# Patient Record
Sex: FEMALE | Race: WHITE | NOT HISPANIC OR LATINO | Employment: OTHER | URBAN - METROPOLITAN AREA
[De-identification: names, ages, dates, MRNs, and addresses within clinical notes are randomized per-mention and may not be internally consistent; named-entity substitution may affect disease eponyms.]

---

## 2017-05-04 ENCOUNTER — RECORDS - HEALTHEAST (OUTPATIENT)
Dept: LAB | Facility: CLINIC | Age: 62
End: 2017-05-04

## 2017-05-05 LAB
CHOLEST SERPL-MCNC: 181 MG/DL
FASTING STATUS PATIENT QL REPORTED: NORMAL
HDLC SERPL-MCNC: 57 MG/DL
LDLC SERPL CALC-MCNC: 98 MG/DL
TRIGL SERPL-MCNC: 131 MG/DL

## 2017-11-27 ENCOUNTER — RECORDS - HEALTHEAST (OUTPATIENT)
Dept: LAB | Facility: CLINIC | Age: 62
End: 2017-11-27

## 2017-11-28 LAB
CHOLEST SERPL-MCNC: 201 MG/DL
FASTING STATUS PATIENT QL REPORTED: ABNORMAL
HDLC SERPL-MCNC: 61 MG/DL
LDLC SERPL CALC-MCNC: 105 MG/DL
TRIGL SERPL-MCNC: 177 MG/DL

## 2018-02-05 ENCOUNTER — RECORDS - HEALTHEAST (OUTPATIENT)
Dept: LAB | Facility: CLINIC | Age: 63
End: 2018-02-05

## 2018-02-08 LAB — BACTERIA SPEC CULT: NORMAL

## 2018-09-03 ENCOUNTER — RECORDS - HEALTHEAST (OUTPATIENT)
Dept: LAB | Facility: CLINIC | Age: 63
End: 2018-09-03

## 2018-09-05 LAB — BACTERIA SPEC CULT: NORMAL

## 2019-03-08 ENCOUNTER — RECORDS - HEALTHEAST (OUTPATIENT)
Dept: LAB | Facility: CLINIC | Age: 64
End: 2019-03-08

## 2019-03-08 LAB
ANION GAP SERPL CALCULATED.3IONS-SCNC: 10 MMOL/L (ref 5–18)
BUN SERPL-MCNC: 14 MG/DL (ref 8–22)
CALCIUM SERPL-MCNC: 9.7 MG/DL (ref 8.5–10.5)
CHLORIDE BLD-SCNC: 103 MMOL/L (ref 98–107)
CHOLEST SERPL-MCNC: 175 MG/DL
CO2 SERPL-SCNC: 24 MMOL/L (ref 22–31)
CREAT SERPL-MCNC: 0.69 MG/DL (ref 0.6–1.1)
FASTING STATUS PATIENT QL REPORTED: NO
GFR SERPL CREATININE-BSD FRML MDRD: >60 ML/MIN/1.73M2
GLUCOSE BLD-MCNC: 151 MG/DL (ref 70–125)
HDLC SERPL-MCNC: 64 MG/DL
LDLC SERPL CALC-MCNC: 80 MG/DL
POTASSIUM BLD-SCNC: 3.6 MMOL/L (ref 3.5–5)
SODIUM SERPL-SCNC: 137 MMOL/L (ref 136–145)
TRIGL SERPL-MCNC: 154 MG/DL

## 2019-04-07 ENCOUNTER — RECORDS - HEALTHEAST (OUTPATIENT)
Dept: LAB | Facility: CLINIC | Age: 64
End: 2019-04-07

## 2019-04-09 LAB — BACTERIA SPEC CULT: NORMAL

## 2019-06-24 ENCOUNTER — RECORDS - HEALTHEAST (OUTPATIENT)
Dept: LAB | Facility: CLINIC | Age: 64
End: 2019-06-24

## 2019-06-24 LAB
ANION GAP SERPL CALCULATED.3IONS-SCNC: 12 MMOL/L (ref 5–18)
BUN SERPL-MCNC: 12 MG/DL (ref 8–22)
CALCIUM SERPL-MCNC: 10.1 MG/DL (ref 8.5–10.5)
CHLORIDE BLD-SCNC: 103 MMOL/L (ref 98–107)
CO2 SERPL-SCNC: 27 MMOL/L (ref 22–31)
CREAT SERPL-MCNC: 0.69 MG/DL (ref 0.6–1.1)
GFR SERPL CREATININE-BSD FRML MDRD: >60 ML/MIN/1.73M2
GLUCOSE BLD-MCNC: 121 MG/DL (ref 70–125)
POTASSIUM BLD-SCNC: 3.7 MMOL/L (ref 3.5–5)
SODIUM SERPL-SCNC: 142 MMOL/L (ref 136–145)

## 2020-07-13 ENCOUNTER — RECORDS - HEALTHEAST (OUTPATIENT)
Dept: LAB | Facility: CLINIC | Age: 65
End: 2020-07-13

## 2020-07-13 LAB
ALBUMIN SERPL-MCNC: 3.8 G/DL (ref 3.5–5)
ALP SERPL-CCNC: 69 U/L (ref 45–120)
ALT SERPL W P-5'-P-CCNC: 40 U/L (ref 0–45)
ANION GAP SERPL CALCULATED.3IONS-SCNC: 7 MMOL/L (ref 5–18)
AST SERPL W P-5'-P-CCNC: 24 U/L (ref 0–40)
BILIRUB SERPL-MCNC: 0.4 MG/DL (ref 0–1)
BUN SERPL-MCNC: 13 MG/DL (ref 8–22)
CALCIUM SERPL-MCNC: 10.6 MG/DL (ref 8.5–10.5)
CHLORIDE BLD-SCNC: 102 MMOL/L (ref 98–107)
CHOLEST SERPL-MCNC: 172 MG/DL
CO2 SERPL-SCNC: 31 MMOL/L (ref 22–31)
CREAT SERPL-MCNC: 0.72 MG/DL (ref 0.6–1.1)
FASTING STATUS PATIENT QL REPORTED: NO
GFR SERPL CREATININE-BSD FRML MDRD: >60 ML/MIN/1.73M2
GLUCOSE BLD-MCNC: 98 MG/DL (ref 70–125)
HDLC SERPL-MCNC: 52 MG/DL
LDLC SERPL CALC-MCNC: 77 MG/DL
POTASSIUM BLD-SCNC: 3.5 MMOL/L (ref 3.5–5)
PROT SERPL-MCNC: 6.7 G/DL (ref 6–8)
SODIUM SERPL-SCNC: 140 MMOL/L (ref 136–145)
TRIGL SERPL-MCNC: 215 MG/DL

## 2020-11-18 ENCOUNTER — RECORDS - HEALTHEAST (OUTPATIENT)
Dept: LAB | Facility: CLINIC | Age: 65
End: 2020-11-18

## 2020-11-18 LAB
ALBUMIN SERPL-MCNC: 3.7 G/DL (ref 3.5–5)
ALP SERPL-CCNC: 70 U/L (ref 45–120)
ALT SERPL W P-5'-P-CCNC: 22 U/L (ref 0–45)
ANION GAP SERPL CALCULATED.3IONS-SCNC: 13 MMOL/L (ref 5–18)
AST SERPL W P-5'-P-CCNC: 22 U/L (ref 0–40)
BILIRUB SERPL-MCNC: 0.5 MG/DL (ref 0–1)
BUN SERPL-MCNC: 14 MG/DL (ref 8–22)
CALCIUM SERPL-MCNC: 9.9 MG/DL (ref 8.5–10.5)
CHLORIDE BLD-SCNC: 104 MMOL/L (ref 98–107)
CO2 SERPL-SCNC: 22 MMOL/L (ref 22–31)
CREAT SERPL-MCNC: 0.71 MG/DL (ref 0.6–1.1)
ERYTHROCYTE [SEDIMENTATION RATE] IN BLOOD BY WESTERGREN METHOD: 16 MM/HR (ref 0–20)
GFR SERPL CREATININE-BSD FRML MDRD: >60 ML/MIN/1.73M2
GLUCOSE BLD-MCNC: 136 MG/DL (ref 70–125)
POTASSIUM BLD-SCNC: 3.5 MMOL/L (ref 3.5–5)
PROT SERPL-MCNC: 6.7 G/DL (ref 6–8)
SODIUM SERPL-SCNC: 139 MMOL/L (ref 136–145)
TSH SERPL DL<=0.005 MIU/L-ACNC: 1.74 UIU/ML (ref 0.3–5)
VIT B12 SERPL-MCNC: 396 PG/ML (ref 213–816)

## 2020-11-19 LAB — B BURGDOR IGG+IGM SER QL: 0.04 INDEX VALUE

## 2021-05-30 ENCOUNTER — RECORDS - HEALTHEAST (OUTPATIENT)
Dept: ADMINISTRATIVE | Facility: CLINIC | Age: 66
End: 2021-05-30

## 2021-07-23 ENCOUNTER — LAB REQUISITION (OUTPATIENT)
Dept: LAB | Facility: CLINIC | Age: 66
End: 2021-07-23

## 2021-07-23 DIAGNOSIS — I10 ESSENTIAL (PRIMARY) HYPERTENSION: ICD-10-CM

## 2021-07-23 DIAGNOSIS — E78.2 MIXED HYPERLIPIDEMIA: ICD-10-CM

## 2021-07-23 LAB
ANION GAP SERPL CALCULATED.3IONS-SCNC: 12 MMOL/L (ref 5–18)
BUN SERPL-MCNC: 7 MG/DL (ref 8–22)
CALCIUM SERPL-MCNC: 9.8 MG/DL (ref 8.5–10.5)
CHLORIDE BLD-SCNC: 101 MMOL/L (ref 98–107)
CHOLEST SERPL-MCNC: 168 MG/DL
CO2 SERPL-SCNC: 25 MMOL/L (ref 22–31)
CREAT SERPL-MCNC: 0.71 MG/DL (ref 0.6–1.1)
FASTING STATUS PATIENT QL REPORTED: NORMAL
GFR SERPL CREATININE-BSD FRML MDRD: 88 ML/MIN/1.73M2
GLUCOSE BLD-MCNC: 181 MG/DL (ref 70–125)
HDLC SERPL-MCNC: 57 MG/DL
LDLC SERPL CALC-MCNC: 87 MG/DL
POTASSIUM BLD-SCNC: 3.9 MMOL/L (ref 3.5–5)
SODIUM SERPL-SCNC: 138 MMOL/L (ref 136–145)
TRIGL SERPL-MCNC: 119 MG/DL

## 2021-07-23 PROCEDURE — 80061 LIPID PANEL: CPT | Performed by: PHYSICIAN ASSISTANT

## 2021-07-23 PROCEDURE — 80048 BASIC METABOLIC PNL TOTAL CA: CPT | Performed by: PHYSICIAN ASSISTANT

## 2022-07-06 ENCOUNTER — LAB REQUISITION (OUTPATIENT)
Dept: LAB | Facility: CLINIC | Age: 67
End: 2022-07-06

## 2022-07-06 DIAGNOSIS — I10 ESSENTIAL (PRIMARY) HYPERTENSION: ICD-10-CM

## 2022-07-06 DIAGNOSIS — E78.2 MIXED HYPERLIPIDEMIA: ICD-10-CM

## 2022-07-06 LAB
ANION GAP SERPL CALCULATED.3IONS-SCNC: 12 MMOL/L (ref 7–15)
BUN SERPL-MCNC: 10.2 MG/DL (ref 8–23)
CALCIUM SERPL-MCNC: 10.2 MG/DL (ref 8.8–10.2)
CHLORIDE SERPL-SCNC: 100 MMOL/L (ref 98–107)
CHOLEST SERPL-MCNC: 197 MG/DL
CREAT SERPL-MCNC: 0.63 MG/DL (ref 0.51–0.95)
DEPRECATED HCO3 PLAS-SCNC: 27 MMOL/L (ref 22–29)
GFR SERPL CREATININE-BSD FRML MDRD: >90 ML/MIN/1.73M2
GLUCOSE SERPL-MCNC: 108 MG/DL (ref 70–99)
HDLC SERPL-MCNC: 64 MG/DL
LDLC SERPL CALC-MCNC: 107 MG/DL
NONHDLC SERPL-MCNC: 133 MG/DL
POTASSIUM SERPL-SCNC: 3.6 MMOL/L (ref 3.4–5.3)
SODIUM SERPL-SCNC: 139 MMOL/L (ref 136–145)
TRIGL SERPL-MCNC: 130 MG/DL

## 2022-07-06 PROCEDURE — 80061 LIPID PANEL: CPT | Performed by: PHYSICIAN ASSISTANT

## 2022-07-06 PROCEDURE — 82310 ASSAY OF CALCIUM: CPT | Performed by: PHYSICIAN ASSISTANT

## 2022-12-28 ENCOUNTER — LAB REQUISITION (OUTPATIENT)
Dept: LAB | Facility: CLINIC | Age: 67
End: 2022-12-28

## 2022-12-28 DIAGNOSIS — I10 ESSENTIAL (PRIMARY) HYPERTENSION: ICD-10-CM

## 2022-12-28 LAB
ANION GAP SERPL CALCULATED.3IONS-SCNC: 14 MMOL/L (ref 7–15)
BUN SERPL-MCNC: 9.6 MG/DL (ref 8–23)
CALCIUM SERPL-MCNC: 10.3 MG/DL (ref 8.8–10.2)
CHLORIDE SERPL-SCNC: 101 MMOL/L (ref 98–107)
CREAT SERPL-MCNC: 0.66 MG/DL (ref 0.51–0.95)
DEPRECATED HCO3 PLAS-SCNC: 22 MMOL/L (ref 22–29)
GFR SERPL CREATININE-BSD FRML MDRD: >90 ML/MIN/1.73M2
GLUCOSE SERPL-MCNC: 118 MG/DL (ref 70–99)
POTASSIUM SERPL-SCNC: 3.9 MMOL/L (ref 3.4–5.3)
SODIUM SERPL-SCNC: 137 MMOL/L (ref 136–145)

## 2022-12-28 PROCEDURE — 80048 BASIC METABOLIC PNL TOTAL CA: CPT | Performed by: PHYSICIAN ASSISTANT

## 2023-01-06 ENCOUNTER — LAB REQUISITION (OUTPATIENT)
Dept: LAB | Facility: CLINIC | Age: 68
End: 2023-01-06

## 2023-01-06 DIAGNOSIS — E83.52 HYPERCALCEMIA: ICD-10-CM

## 2023-01-06 LAB
ANION GAP SERPL CALCULATED.3IONS-SCNC: 13 MMOL/L (ref 7–15)
BUN SERPL-MCNC: 11.3 MG/DL (ref 8–23)
CALCIUM SERPL-MCNC: 10.1 MG/DL (ref 8.8–10.2)
CHLORIDE SERPL-SCNC: 101 MMOL/L (ref 98–107)
CREAT SERPL-MCNC: 0.62 MG/DL (ref 0.51–0.95)
DEPRECATED CALCIDIOL+CALCIFEROL SERPL-MC: 32 UG/L (ref 20–75)
DEPRECATED HCO3 PLAS-SCNC: 24 MMOL/L (ref 22–29)
GFR SERPL CREATININE-BSD FRML MDRD: >90 ML/MIN/1.73M2
GLUCOSE SERPL-MCNC: 100 MG/DL (ref 70–99)
POTASSIUM SERPL-SCNC: 4.3 MMOL/L (ref 3.4–5.3)
PTH-INTACT SERPL-MCNC: 47 PG/ML (ref 15–65)
SODIUM SERPL-SCNC: 138 MMOL/L (ref 136–145)

## 2023-01-06 PROCEDURE — 80048 BASIC METABOLIC PNL TOTAL CA: CPT | Performed by: PHYSICIAN ASSISTANT

## 2023-01-06 PROCEDURE — 82306 VITAMIN D 25 HYDROXY: CPT | Performed by: PHYSICIAN ASSISTANT

## 2023-01-06 PROCEDURE — 83970 ASSAY OF PARATHORMONE: CPT | Performed by: PHYSICIAN ASSISTANT

## 2023-07-21 ENCOUNTER — LAB REQUISITION (OUTPATIENT)
Dept: LAB | Facility: CLINIC | Age: 68
End: 2023-07-21

## 2023-07-21 DIAGNOSIS — I10 ESSENTIAL (PRIMARY) HYPERTENSION: ICD-10-CM

## 2023-07-21 DIAGNOSIS — E78.2 MIXED HYPERLIPIDEMIA: ICD-10-CM

## 2023-07-21 LAB
ALBUMIN SERPL BCG-MCNC: 4.1 G/DL (ref 3.5–5.2)
ALP SERPL-CCNC: 91 U/L (ref 35–104)
ALT SERPL W P-5'-P-CCNC: 24 U/L (ref 0–50)
ANION GAP SERPL CALCULATED.3IONS-SCNC: 11 MMOL/L (ref 7–15)
AST SERPL W P-5'-P-CCNC: 19 U/L (ref 0–45)
BILIRUB SERPL-MCNC: 0.4 MG/DL
BUN SERPL-MCNC: 6.7 MG/DL (ref 8–23)
CALCIUM SERPL-MCNC: 9.7 MG/DL (ref 8.8–10.2)
CHLORIDE SERPL-SCNC: 103 MMOL/L (ref 98–107)
CHOLEST SERPL-MCNC: 175 MG/DL
CREAT SERPL-MCNC: 0.73 MG/DL (ref 0.51–0.95)
DEPRECATED HCO3 PLAS-SCNC: 26 MMOL/L (ref 22–29)
GFR SERPL CREATININE-BSD FRML MDRD: 88 ML/MIN/1.73M2
GLUCOSE SERPL-MCNC: 123 MG/DL (ref 70–99)
HDLC SERPL-MCNC: 59 MG/DL
LDLC SERPL CALC-MCNC: 91 MG/DL
NONHDLC SERPL-MCNC: 116 MG/DL
POTASSIUM SERPL-SCNC: 4.3 MMOL/L (ref 3.4–5.3)
PROT SERPL-MCNC: 6.3 G/DL (ref 6.4–8.3)
SODIUM SERPL-SCNC: 140 MMOL/L (ref 136–145)
TRIGL SERPL-MCNC: 123 MG/DL

## 2023-07-21 PROCEDURE — 80053 COMPREHEN METABOLIC PANEL: CPT | Performed by: PHYSICIAN ASSISTANT

## 2023-07-21 PROCEDURE — 80061 LIPID PANEL: CPT | Performed by: PHYSICIAN ASSISTANT

## 2024-04-17 ENCOUNTER — TRANSFERRED RECORDS (OUTPATIENT)
Dept: HEALTH INFORMATION MANAGEMENT | Facility: CLINIC | Age: 69
End: 2024-04-17

## 2024-04-17 ENCOUNTER — LAB REQUISITION (OUTPATIENT)
Dept: LAB | Facility: CLINIC | Age: 69
End: 2024-04-17

## 2024-04-17 DIAGNOSIS — I10 ESSENTIAL (PRIMARY) HYPERTENSION: ICD-10-CM

## 2024-04-17 DIAGNOSIS — E78.2 MIXED HYPERLIPIDEMIA: ICD-10-CM

## 2024-04-17 LAB — HBA1C MFR BLD: 5.6 % (ref 4.2–6.1)

## 2024-04-17 PROCEDURE — 80061 LIPID PANEL: CPT | Performed by: PHYSICIAN ASSISTANT

## 2024-04-17 PROCEDURE — 80048 BASIC METABOLIC PNL TOTAL CA: CPT | Performed by: PHYSICIAN ASSISTANT

## 2024-04-19 LAB
ANION GAP SERPL CALCULATED.3IONS-SCNC: 10 MMOL/L (ref 7–15)
BUN SERPL-MCNC: 8.2 MG/DL (ref 8–23)
CALCIUM SERPL-MCNC: 9.8 MG/DL (ref 8.8–10.2)
CHLORIDE SERPL-SCNC: 104 MMOL/L (ref 98–107)
CHOLEST SERPL-MCNC: 179 MG/DL
CREAT SERPL-MCNC: 0.68 MG/DL (ref 0.51–0.95)
DEPRECATED HCO3 PLAS-SCNC: 28 MMOL/L (ref 22–29)
EGFRCR SERPLBLD CKD-EPI 2021: >90 ML/MIN/1.73M2
FASTING STATUS PATIENT QL REPORTED: NORMAL
GLUCOSE SERPL-MCNC: 114 MG/DL (ref 70–99)
HDLC SERPL-MCNC: 58 MG/DL
LDLC SERPL CALC-MCNC: 97 MG/DL
NONHDLC SERPL-MCNC: 121 MG/DL
POTASSIUM SERPL-SCNC: 3.8 MMOL/L (ref 3.4–5.3)
SODIUM SERPL-SCNC: 142 MMOL/L (ref 135–145)
TRIGL SERPL-MCNC: 122 MG/DL

## 2024-05-17 ENCOUNTER — TRANSFERRED RECORDS (OUTPATIENT)
Dept: HEALTH INFORMATION MANAGEMENT | Facility: CLINIC | Age: 69
End: 2024-05-17

## 2024-05-23 ENCOUNTER — TRANSFERRED RECORDS (OUTPATIENT)
Dept: HEALTH INFORMATION MANAGEMENT | Facility: CLINIC | Age: 69
End: 2024-05-23

## 2024-06-11 ENCOUNTER — TRANSFERRED RECORDS (OUTPATIENT)
Dept: HEALTH INFORMATION MANAGEMENT | Facility: CLINIC | Age: 69
End: 2024-06-11
Payer: COMMERCIAL

## 2024-06-19 ENCOUNTER — MEDICAL CORRESPONDENCE (OUTPATIENT)
Dept: HEALTH INFORMATION MANAGEMENT | Facility: CLINIC | Age: 69
End: 2024-06-19
Payer: COMMERCIAL

## 2024-06-19 ENCOUNTER — TELEPHONE (OUTPATIENT)
Dept: OTHER | Age: 69
End: 2024-06-19

## 2024-06-19 NOTE — TELEPHONE ENCOUNTER
What is the Concern:  Tumor, PT REQUESTING Dr. Connell or Dr. Stark  Date the concern started: early May 1  Injury related? no  Is this related to recent surgery?no  Laceration?  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 yes  Location the patient was evaluated and treated for the condition?   Primary Care, Location Jo Felipe at [facility]  Who is referring provider, (name and clinic location) Jo Felipe  What images have been done? MRI; Location and City where images were taken:  [place] in [place], pt also had an US.  XR at [facility]  Could we send this information to you in [place] or would you prefer to receive a phone call?:   Patient would prefer a phone call   Okay to leave a detailed message?: Yes at Cell number on file:    Telephone Information:   Mobile [phone]

## 2024-06-19 NOTE — TELEPHONE ENCOUNTER
Triaged with Dr. Mcclellan who states this patient is OK to wait until 6/27.    Tere Gonzalez LPN

## 2024-06-20 ENCOUNTER — TRANSCRIBE ORDERS (OUTPATIENT)
Dept: OTHER | Age: 69
End: 2024-06-20

## 2024-06-20 DIAGNOSIS — R22.41 LOCALIZED SWELLING, MASS AND LUMP, RIGHT LOWER LIMB: Primary | ICD-10-CM

## 2024-06-26 ENCOUNTER — PRE VISIT (OUTPATIENT)
Dept: ORTHOPEDICS | Facility: CLINIC | Age: 69
End: 2024-06-26
Payer: COMMERCIAL

## 2024-06-27 ENCOUNTER — OFFICE VISIT (OUTPATIENT)
Dept: ORTHOPEDICS | Facility: CLINIC | Age: 69
End: 2024-06-27
Payer: COMMERCIAL

## 2024-06-27 DIAGNOSIS — R22.41 LOCALIZED SWELLING, MASS AND LUMP, RIGHT LOWER LIMB: ICD-10-CM

## 2024-06-27 DIAGNOSIS — M79.89 SOFT TISSUE MASS: Primary | ICD-10-CM

## 2024-06-27 PROCEDURE — 99204 OFFICE O/P NEW MOD 45 MIN: CPT | Mod: 25 | Performed by: PHYSICIAN ASSISTANT

## 2024-06-27 PROCEDURE — 88307 TISSUE EXAM BY PATHOLOGIST: CPT | Mod: TC | Performed by: ORTHOPAEDIC SURGERY

## 2024-06-27 PROCEDURE — 88307 TISSUE EXAM BY PATHOLOGIST: CPT | Mod: 26 | Performed by: PATHOLOGY

## 2024-06-27 PROCEDURE — 88342 IMHCHEM/IMCYTCHM 1ST ANTB: CPT | Mod: 26 | Performed by: PATHOLOGY

## 2024-06-27 PROCEDURE — 20206 BIOPSY MUSCLE PERQ NEEDLE: CPT | Mod: RT | Performed by: ORTHOPAEDIC SURGERY

## 2024-06-27 RX ORDER — DULOXETIN HYDROCHLORIDE 30 MG/1
90 CAPSULE, DELAYED RELEASE ORAL EVERY MORNING
COMMUNITY
Start: 2024-04-25

## 2024-06-27 RX ORDER — EPINEPHRINE 0.3 MG/.3ML
INJECTION SUBCUTANEOUS
COMMUNITY
Start: 2022-11-17 | End: 2024-07-16

## 2024-06-27 RX ORDER — ATENOLOL 25 MG/1
1 TABLET ORAL EVERY MORNING
COMMUNITY

## 2024-06-27 RX ORDER — SIMVASTATIN 20 MG
20 TABLET ORAL EVERY MORNING
COMMUNITY

## 2024-06-27 RX ORDER — METHOCARBAMOL 500 MG/1
TABLET, FILM COATED ORAL
COMMUNITY
Start: 2023-12-27 | End: 2024-07-16

## 2024-06-27 RX ORDER — NAPROXEN 500 MG/1
TABLET ORAL
COMMUNITY
Start: 2023-12-12 | End: 2024-07-16

## 2024-06-27 RX ORDER — LISINOPRIL 30 MG/1
1 TABLET ORAL EVERY MORNING
COMMUNITY
Start: 2024-02-25

## 2024-06-27 NOTE — NURSING NOTE
[facility] [address] [address]  [phone]  Dept: [phone]  ______________________________________________________________________________    Patient: Shonna Joy   : 1955   MRN: [id number]   2024    INVASIVE PROCEDURE SAFETY CHECKLIST    Date: 2024   Procedure: Right thigh needle biopsy  Patient Name: Shonna Joy  MRN: [id number]  YOB: 1955    Action: Complete sections as appropriate. Any discrepancy results in a HARD COPY until resolved.     PRE PROCEDURE:  Patient ID verified with 2 identifiers (name and  or MRN): Yes  Procedure and site verified with patient/designee (when able): Yes  Accurate consent documentation in medical record: Yes  H&P (or appropriate assessment) documented in medical record: NA  H&P must be up to 20 days prior to procedure and updates within 24 hours of procedure as applicable: NA  Relevant diagnostic and radiology test results appropriately labeled and displayed as applicable: Yes  Procedure site(s) marked with provider initials: NA    TIMEOUT:  Time-Out performed immediately prior to starting procedure, including verbal and active participation of all team members addressing the following:Yes  * Correct patient identify  * Confirmed that the correct side and site are marked  * An accurate procedure consent form  * Agreement on the procedure to be done  * Correct patient position  * Relevant images and results are properly labeled and appropriately displayed  * The need to administer antibiotics or fluids for irrigation purposes during the procedure as applicable   * Safety precautions based on patient history or medication use    DURING PROCEDURE: Verification of correct person, site, and procedures any time the responsibility for care of the patient is transferred to another member of the care team.       The following medications were given:          Prior to injection, verified patient identity using patient's name and date of birth.  Due to injection administration, patient instructed to remain in clinic for 15 minutes  afterwards, and to report any adverse reaction to me immediately.    Lidocaine Injection for Biopsy   Medication Name: Lidocaine  NDC 55957-904-23  Drug Amount Wasted:  Yes: 10 mg/ml   Vial/Syringe: Single dose vial  Expiration Date:  5/1/27  Lot: [id number]      Scribed by Jennifer Miguel ATC for Dr. Stark on June 27, 2024 at 2:00 pm based on the provider's statements to me.     Jennifer Miguel ATC

## 2024-06-27 NOTE — NURSING NOTE
Reason For Visit:   Chief Complaint   Patient presents with    Consult     Right thigh mass referred by GISELE Richter. First noticed beginning of May 2024          69 year old  1955        Primary MD: Jo Leon  Ref. MD: Jo Leon        Pain Assessment  Patient Currently in Pain: Yes  0-10 Pain Scale: 1  Primary Pain Location:  (right posterior thigh, also notes pain in back)            Sachin Miguel, ATC

## 2024-06-27 NOTE — LETTER
6/27/2024      Shonna Joy  [address]  [facility] [address]      Dear Colleague,    Thank you for referring your patient, Shonna Joy, to the [facility] [place]. Please see a copy of my visit note below.    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  Await final pathology.    Plan: Tima-Cut biopsy today.    I saw the patient Maki JAQUEZ.  I examined her and performed a biopsy.  This Erich will do the billing for the next clinic encounter.  I agree with her assessment and plan.  Will perform biopsy today and connect with the patient after we have the results.    Procedure note.  After informed consent the right adductor region was prepped and draped sterilely.  Surgical timeout was performed.  The skin was infiltrated with 4 cc of lidocaine.  11 blade was used to penetrate the skin.  3 core biopsy samples were taken with a Tima-Cut needle.  He is appeared myxoid in nature.    Chief Complaint: Right thigh mass    History: Shonna is a 69 year old female here with her  today for further evaluation of a right inner thigh mass.  Patient reports she noticed this about a month ago that there was a mass that the right inner posterior groin area.  She feels some pain with sitting.  It otherwise does not bother her.  It does not wake her up at night.  She is unsure if it is growing.  She does report increased fatigue over the last several months.  She has had an unintentional 8 pound weight loss over the last 6 months.  She does report some fever and night sweats.  Decreased appetite and increased thirst.  No other concerns.    PastMedHx: HTN; history of uterine cancer in 2006    PastSurgHx: Complete hysterectomy and appendectomy in 2006 for uterine cancer with 8 weeks of pelvic radiation including vaginal seeding.    Social History     Tobacco Use    Smoking status: Not on file    Smokeless tobacco: Not on file   Substance Use Topics    Alcohol use:  Not on file     Meds:   Current Outpatient Medications   Medication Sig Dispense Refill    Aspirin 81 MG CAPS Take 1 capsule every day by oral route.      atenolol (TENORMIN) 25 MG tablet Take 1 tablet by mouth daily      DULoxetine (CYMBALTA) 30 MG capsule TAKE 3 CAPSULES BY MOUTH DAILY.*      EPINEPHrine (ANY BX GENERIC EQUIV) 0.3 MG/0.3ML injection 2-pack AS NEEDED intramuscularly 1 dose. 30      lisinopril (ZESTRIL) 30 MG tablet Take 1 tablet by mouth daily at 2 pm      methocarbamol (ROBAXIN) 500 MG tablet take 1- 1.5 tablets by mouth every 4 -6 hrs as needed for back pain*      naproxen (NAPROSYN) 500 MG tablet take 1 tablet with food or milk as needed Orally every 12 hrs for 14 days*      simvastatin (ZOCOR) 20 MG tablet Take 1 tablet by mouth one time daily at bedtime.      Vitamin D, Cholecalciferol, 10 MCG (400 UNIT) CHEW        No current facility-administered medications for this visit.     Allergies:    Allergies   Allergen Reactions    Bee Venom Anaphylaxis and Hives    Penicillins Unknown    Sulfa (Sulfonamide Antibiotics) [Sulfa Antibiotics] Unknown    Sulfasalazine Rash     Review of Systems:  ROS: 10 point ROS neg other than the symptoms noted above in the HPI.    Physical Exam: There were no vitals taken for this visit.  Deniz is a 69-year-old female who is alert and oriented no apparent distress.  She has a nonantalgic reciprocal gait without gait assistance today.  She has a palpable, firm, somewhat mobile golf ball size mass in the adductor muscle of the right thigh proximally.  This is minimally tender.  She has otherwise full range of motion of the right hip.  She is neurovascular intact distally.    Imaging: MRI with and without contrast of the right hip area was reviewed.  This shows an ovoid T1 hypointense and STIR hyperintense heterogeneously enhancing mass located within the right abductor casimiro muscle belly measuring 5.6 x 6.3 x 5.8 cm with mild surrounding intramuscular  edema.    Impression: 69-year-old female with right groin mass in the adductor muscle, concerning for sarcoma    Plan: We explained to the patient that she has a soft tissue mass.  We are unsure what it is.  It is somewhat concerning.  We would recommend a Tima-Cut biopsy in clinic today.  We discussed the risk and benefits of it.  Patient would like to proceed with that.  Refer to Dr. Stark's note for documentation of that procedure.  Once we get the results of this biopsy, we will call the patient and then determine the next plan.  If this is a benign tumor, she would still like it removed, we can discuss the plan for that.  If this was a sarcoma, she would require radiation likely to this area and then surgical excision.  She does have a history of radiation close to this area with her previous uterine cancer.  She will be presented at our tumor conference to if need be.  She agrees with the plan and all questions have been answered today.  Patient was also examined by Dr. Stark, and he agrees with the plan of care.    Sincerely,        Mitchel Stark MD

## 2024-06-27 NOTE — TELEPHONE ENCOUNTER
DIAGNOSIS: RIGHT THIGH MASS   APPOINTMENT DATE: 06/27/2023   NOTES STATUS DETAILS   OFFICE NOTE from referring provider Media Tab Jo Leon PA-C - [place]   MRI PACS [facility]:  06/11/2024 - RT FEMUR     CT SCAN PACS [facility]:  05/23/2024 - RT Lower Extremity

## 2024-06-27 NOTE — PROGRESS NOTES
Chief Complaint: Right thigh mass    History: Shonna is a 69 year old female here with her  today for further evaluation of a right inner thigh mass.  Patient reports she noticed this about a month ago that there was a mass that the right inner posterior groin area.  She feels some pain with sitting.  It otherwise does not bother her.  It does not wake her up at night.  She is unsure if it is growing.  She does report increased fatigue over the last several months.  She has had an unintentional 8 pound weight loss over the last 6 months.  She does report some fever and night sweats.  Decreased appetite and increased thirst.  No other concerns.    PastMedHx: HTN; history of uterine cancer in 2006    PastSurgHx: Complete hysterectomy and appendectomy in 2006 for uterine cancer with 8 weeks of pelvic radiation including vaginal seeding.    Social History     Tobacco Use    Smoking status: Not on file    Smokeless tobacco: Not on file   Substance Use Topics    Alcohol use: Not on file     Meds:   Current Outpatient Medications   Medication Sig Dispense Refill    Aspirin 81 MG CAPS Take 1 capsule every day by oral route.      atenolol (TENORMIN) 25 MG tablet Take 1 tablet by mouth daily      DULoxetine (CYMBALTA) 30 MG capsule TAKE 3 CAPSULES BY MOUTH DAILY.*      EPINEPHrine (ANY BX GENERIC EQUIV) 0.3 MG/0.3ML injection 2-pack AS NEEDED intramuscularly 1 dose. 30      lisinopril (ZESTRIL) 30 MG tablet Take 1 tablet by mouth daily at 2 pm      methocarbamol (ROBAXIN) 500 MG tablet take 1- 1.5 tablets by mouth every 4 -6 hrs as needed for back pain*      naproxen (NAPROSYN) 500 MG tablet take 1 tablet with food or milk as needed Orally every 12 hrs for 14 days*      simvastatin (ZOCOR) 20 MG tablet Take 1 tablet by mouth one time daily at bedtime.      Vitamin D, Cholecalciferol, 10 MCG (400 UNIT) CHEW        No current facility-administered medications for this visit.     Allergies:    Allergies   Allergen  Reactions    Bee Venom Anaphylaxis and Hives    Penicillins Unknown    Sulfa (Sulfonamide Antibiotics) [Sulfa Antibiotics] Unknown    Sulfasalazine Rash     Review of Systems:  ROS: 10 point ROS neg other than the symptoms noted above in the HPI.    Physical Exam: There were no vitals taken for this visit.  Deniz is a 69-year-old female who is alert and oriented no apparent distress.  She has a nonantalgic reciprocal gait without gait assistance today.  She has a palpable, firm, somewhat mobile golf ball size mass in the adductor muscle of the right thigh proximally.  This is minimally tender.  She has otherwise full range of motion of the right hip.  She is neurovascular intact distally.    Imaging: MRI with and without contrast of the right hip area was reviewed.  This shows an ovoid T1 hypointense and STIR hyperintense heterogeneously enhancing mass located within the right abductor casimiro muscle belly measuring 5.6 x 6.3 x 5.8 cm with mild surrounding intramuscular edema.    Impression: 69-year-old female with right groin mass in the adductor muscle, concerning for sarcoma    Plan: We explained to the patient that she has a soft tissue mass.  We are unsure what it is.  It is somewhat concerning.  We would recommend a Tima-Cut biopsy in clinic today.  We discussed the risk and benefits of it.  Patient would like to proceed with that.  Refer to Dr. Stark's note for documentation of that procedure.  Once we get the results of this biopsy, we will call the patient and then determine the next plan.  If this is a benign tumor, she would still like it removed, we can discuss the plan for that.  If this was a sarcoma, she would require radiation likely to this area and then surgical excision.  She does have a history of radiation close to this area with her previous uterine cancer.  She will be presented at our tumor conference to if need be.  She agrees with the plan and all questions have been answered today.  Patient  was also examined by Dr. Stark, and he agrees with the plan of care.

## 2024-06-27 NOTE — PROGRESS NOTES
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  Await final pathology.    Plan: Tima-Cut biopsy today.    I saw the patient Maki JAQUEZ.  I examined her and performed a biopsy.  This Erich will do the billing for the next clinic encounter.  I agree with her assessment and plan.  Will perform biopsy today and connect with the patient after we have the results.    Procedure note.  After informed consent the right adductor region was prepped and draped sterilely.  Surgical timeout was performed.  The skin was infiltrated with 4 cc of lidocaine.  11 blade was used to penetrate the skin.  3 core biopsy samples were taken with a Tima-Cut needle.  He is appeared myxoid in nature.

## 2024-07-01 LAB
PATH REPORT.COMMENTS IMP SPEC: NORMAL
PATH REPORT.COMMENTS IMP SPEC: NORMAL
PATH REPORT.FINAL DX SPEC: NORMAL
PATH REPORT.GROSS SPEC: NORMAL
PATH REPORT.MICROSCOPIC SPEC OTHER STN: NORMAL
PATH REPORT.MICROSCOPIC SPEC OTHER STN: NORMAL
PATH REPORT.RELEVANT HX SPEC: NORMAL
PHOTO IMAGE: NORMAL

## 2024-07-03 ENCOUNTER — MYC MEDICAL ADVICE (OUTPATIENT)
Dept: ORTHOPEDICS | Facility: CLINIC | Age: 69
End: 2024-07-03
Payer: COMMERCIAL

## 2024-07-05 NOTE — TELEPHONE ENCOUNTER
Writer called and left voice message for pt to scheduled a follow up appointment with Dr. Stark. Pt can call clinic back to schedule.     Linda Boland LPN

## 2024-07-16 ENCOUNTER — VIRTUAL VISIT (OUTPATIENT)
Dept: ORTHOPEDICS | Facility: CLINIC | Age: 69
End: 2024-07-16
Payer: COMMERCIAL

## 2024-07-16 VITALS — WEIGHT: 152 LBS | HEIGHT: 62 IN | BODY MASS INDEX: 27.97 KG/M2

## 2024-07-16 DIAGNOSIS — D21.21 MYXOMA OF THIGH, RIGHT: Primary | ICD-10-CM

## 2024-07-16 PROCEDURE — 99442 PR PHYSICIAN TELEPHONE EVALUATION 11-20 MIN: CPT | Performed by: ORTHOPAEDIC SURGERY

## 2024-07-16 ASSESSMENT — PAIN SCALES - GENERAL: PAINLEVEL: NO PAIN (0)

## 2024-07-16 NOTE — NURSING NOTE
[place] [address] [address]    Is the patient currently in the state of [place]? YES    Visit mode:VIDEO    If the visit is dropped, the patient can be reconnected by: VIDEO VISIT: Send to e-mail at: [email or web address]    Will anyone else be joining the visit? NO  (If patient encounters technical issues they should call [phone])    How would you like to obtain your AVS? MyChart    Are changes needed to the allergy or medication list? No    Are refills needed on medications prescribed by this physician? NO    Reason for visit: RECHECK    No other vitals to report per pt    Bria HADDADF

## 2024-07-16 NOTE — PROGRESS NOTES
Virtual Visit Details    Type of service:  Video Visit     Impression: Right medial thigh myxoma, 7 cm    Plan: Patient has chosen surgical removal.  Case request form has been completed.    Our video visit was changed to a phone visit for this patient.  She is doing well she is recovering nicely from her biopsy.    I explained to her the diagnosis of myxoma she had already researched it and confirmed the information I provided.    She reports she has no ecchymosis or changes around the biopsy tract.    We discussed the options of observation versus surgical removal.  She would prefer surgical removal.  I discussed the risk of surgery with her including wound infection, tumor recurrence, postoperative infection, postoperative DVT to mention some.  She understands these and would like to proceed with surgery.    This was a telephone visit began at 1:54 PM and ended at 2:05 PM.  The total length of the visit was 11 minutes

## 2024-07-16 NOTE — LETTER
7/16/2024      Shonna Joy  [address]  [facility] [address]      Dear Colleague,    Thank you for referring your patient, Shonna Joy, to the [facility] [place]. Please see a copy of my visit note below.    Virtual Visit Details    Type of service:  Video Visit     Impression: Right medial thigh myxoma, 7 cm    Plan: Patient has chosen surgical removal.  Case request form has been completed.    Our video visit was changed to a phone visit for this patient.  She is doing well she is recovering nicely from her biopsy.    I explained to her the diagnosis of myxoma she had already researched it and confirmed the information I provided.    She reports she has no ecchymosis or changes around the biopsy tract.    We discussed the options of observation versus surgical removal.  She would prefer surgical removal.  I discussed the risk of surgery with her including wound infection, tumor recurrence, postoperative infection, postoperative DVT to mention some.  She understands these and would like to proceed with surgery.    This was a telephone visit began at 1:54 PM and ended at 2:05 PM.  The total length of the visit was 11 minutes      Mitchel Stark MD

## 2024-07-22 ENCOUNTER — TELEPHONE (OUTPATIENT)
Dept: ORTHOPEDICS | Facility: CLINIC | Age: 69
End: 2024-07-22
Payer: COMMERCIAL

## 2024-07-22 NOTE — TELEPHONE ENCOUNTER
Phoned patient to get her scheduled for surgery with Dr. Stark     Call went to voicemail.  Provided call back number in voicemail:   [phone] & [phone] for care team.   Will try again.

## 2024-07-24 PROBLEM — D21.21: Status: ACTIVE | Noted: 2024-07-16

## 2024-07-24 NOTE — TELEPHONE ENCOUNTER
Patient is scheduled for surgery with Dr. Stark    Spoke with: Shonna    Date of Surgery: 9/6/24    Location: [facility]    Informed patient they will need an adult  Yes    Pre op with Provider PAC,     Additional imaging/appointments: PO Made 8/19/24     Surgery packet: Mailed     Additional comments: Declined earlier dates. Requesting c/b from care team to go over recovery and walker restrictions. Will route to care team.         Luna Pierre on 7/24/2024 at 2:55 PM

## 2024-07-25 NOTE — TELEPHONE ENCOUNTER
FUTURE VISIT INFORMATION      SURGERY INFORMATION:  Date: 9/6/24  [facility]  Surgeon:  Mitchel Stark MD   Anesthesia Type:  general  Procedure: removal right thigh tumor   Consult: ov 7/16/24    RECORDS REQUESTED FROM:       Primary Care Provider: Jo Leon PA-C     Pertinent Medical History: hypertension

## 2024-07-27 ENCOUNTER — HEALTH MAINTENANCE LETTER (OUTPATIENT)
Age: 69
End: 2024-07-27

## 2024-08-19 ENCOUNTER — OFFICE VISIT (OUTPATIENT)
Dept: SURGERY | Facility: CLINIC | Age: 69
End: 2024-08-19
Payer: COMMERCIAL

## 2024-08-19 ENCOUNTER — ANESTHESIA EVENT (OUTPATIENT)
Dept: SURGERY | Facility: AMBULATORY SURGERY CENTER | Age: 69
End: 2024-08-19
Payer: COMMERCIAL

## 2024-08-19 ENCOUNTER — PRE VISIT (OUTPATIENT)
Dept: SURGERY | Facility: CLINIC | Age: 69
End: 2024-08-19

## 2024-08-19 ENCOUNTER — LAB (OUTPATIENT)
Dept: LAB | Facility: CLINIC | Age: 69
End: 2024-08-19
Payer: COMMERCIAL

## 2024-08-19 VITALS
TEMPERATURE: 97.8 F | HEART RATE: 61 BPM | RESPIRATION RATE: 16 BRPM | HEIGHT: 62 IN | WEIGHT: 152.1 LBS | SYSTOLIC BLOOD PRESSURE: 164 MMHG | BODY MASS INDEX: 27.99 KG/M2 | OXYGEN SATURATION: 96 % | DIASTOLIC BLOOD PRESSURE: 99 MMHG

## 2024-08-19 DIAGNOSIS — Z01.818 PRE-OP EVALUATION: Primary | ICD-10-CM

## 2024-08-19 DIAGNOSIS — Z01.818 PRE-OP EVALUATION: ICD-10-CM

## 2024-08-19 DIAGNOSIS — R54 FRAILTY: ICD-10-CM

## 2024-08-19 DIAGNOSIS — D21.21 MYXOMA OF THIGH, RIGHT: ICD-10-CM

## 2024-08-19 LAB
ERYTHROCYTE [DISTWIDTH] IN BLOOD BY AUTOMATED COUNT: 12.3 % (ref 10–15)
HCT VFR BLD AUTO: 45.2 % (ref 35–47)
HGB BLD-MCNC: 14.6 G/DL (ref 11.7–15.7)
MCH RBC QN AUTO: 29.6 PG (ref 26.5–33)
MCHC RBC AUTO-ENTMCNC: 32.3 G/DL (ref 31.5–36.5)
MCV RBC AUTO: 92 FL (ref 78–100)
PLATELET # BLD AUTO: 294 10E3/UL (ref 150–450)
RBC # BLD AUTO: 4.94 10E6/UL (ref 3.8–5.2)
WBC # BLD AUTO: 6.1 10E3/UL (ref 4–11)

## 2024-08-19 PROCEDURE — 36415 COLL VENOUS BLD VENIPUNCTURE: CPT | Performed by: PATHOLOGY

## 2024-08-19 PROCEDURE — 99203 OFFICE O/P NEW LOW 30 MIN: CPT | Performed by: PHYSICIAN ASSISTANT

## 2024-08-19 PROCEDURE — 85027 COMPLETE CBC AUTOMATED: CPT | Performed by: PATHOLOGY

## 2024-08-19 RX ORDER — CALCIUM CARBONATE 500 MG/1
1 TABLET, CHEWABLE ORAL 2 TIMES DAILY
COMMUNITY
End: 2024-09-20

## 2024-08-19 ASSESSMENT — PAIN SCALES - GENERAL: PAINLEVEL: NO PAIN (0)

## 2024-08-19 NOTE — PATIENT INSTRUCTIONS
Preparing for Your Surgery      Name:  Shonna Joy   MRN:  [id number]   :  1955   Today's Date:  2024         Arriving for surgery:  Surgery date:  2024  Arrival time:  5:45 AM    Restrictions due to COVID 19:    Please maintain social distance.  Masking is optional        parking is available for anyone with mobility limitations or disabilities. (Monday- Friday 7 am- 5 pm)    Please come to:    [facility]  [address] [place] [address]    Check in on the 5th floor, Ambulatory Surgery Center.    What can I eat or drink?    -  You may eat and drink normally until 8 hours before arrival time  (Until 9:45 PM )  -  You may have clear liquids until 2 hours before arrival time  (Until 3:45 AM)    Examples of clear liquids:  Water  Clear broth  Juices (apple, white grape, white cranberry  and cider) without pulp  Noncarbonated, powder based beverages  (lemonade and Beto-Aid)  Sodas (Sprite, 7-Up, ginger ale and seltzer)  Coffee or tea (without milk or cream)  Gatorade    --No alcohol or cannabis products for at least 24 hours before surgery    Which medicines can I take?    **Hold Aspirin for 7 days before surgery.   Hold Multivitamins for 7 days before surgery.  Hold Supplements for 7 days before surgery.  Hold Ibuprofen (Advil, Motrin) for 1 day before surgery--unless otherwise directed by surgeon.  Hold Naproxen (Aleve) for 4 days before surgery.    -  DO NOT take the following medications the day of surgery:  Lisinopril  Vitamin D  Tums    -  PLEASE TAKE the following medications the day of surgery   Cymbalta  Simvastatin  Atenolol    How do I prepare myself?  - Please take 2 showers (one the night prior to surgery and one the morning of surgery) using Scrubcare or Hibiclens soap.    Use this soap only from the neck to your toes.     Leave the soap on your skin for one minute--then rinse thoroughly.      You may use your own shampoo and  conditioner; no other hair products.   - Please remove all jewelry and body piercings.  - No lotions, deodorants or fragrance.  - No makeup or fingernail polish.   - Bring your ID and insurance card.    -If you have a Deep Brain Stimulator, a Spinal Cord Stimulator or any implanted Neuro device you must bring the remote to the Surgery Center          ALL PATIENTS ARE REQUIRED TO HAVE A RESPONSIBLE ADULT TO DRIVE AND BE IN ATTENDANCE WITH THEM FOR 24 HOURS FOLLOWING SURGERY       Covid testing policy as of 12/06/2022  Your surgeon will notify and schedule you for a COVID test if one is needed before surgery--please direct any questions or COVID symptoms to your surgeon      Questions or Concerns:    -For questions regarding the day of surgery please contact the Ambulatory Surgery Center at [phone].    -If you have health changes between today and your surgery please contact your surgeon.     For questions after surgery please call your surgeons office.

## 2024-08-19 NOTE — H&P
Pre-Operative H & P     CC:  Preoperative exam to assess for increased cardiopulmonary risk while undergoing surgery and anesthesia.    Date of Encounter: 8/19/2024  Primary Care Physician:  Jo Leon     Reason for visit:   Encounter Diagnoses   Name Primary?    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 and [facility]    Providers: Mitchel Stark MD            Patient is being evaluated for comorbid conditions of HTN, HLD, osteoarthritis, history of uterine cancer, and anxiety.    She has a history of a right inner thigh mass that has been present for a few months. She has some associated pain when sitting but is otherwise asymptomatic. She was evaluated by Dr. Stark on 6/27/24 and a biopsy was obtained. This returned positive for a myxoma. Dr. Stark discussed further treatment options with the patient on 7/16/24 and a plan was made to proceed with surgical intervention as above.     History is obtained from the patient and chart review    Hx of abnormal bleeding or anti-platelet use: ASA 81 mg    Menstrual history: No LMP recorded (lmp unknown). Patient has had a hysterectomy.     Past Medical History  Past Medical History:   Diagnosis Date    Anxiety     HLD (hyperlipidemia)     HTN (hypertension)     Myxoma of right thigh     Osteoarthritis        Past Surgical History  Past Surgical History:   Procedure Laterality Date    CATARACT EXTRACTION      FLEXIBLE SIGMOIDOSCOPY  2006    HYSTERECTOMY TOTAL ABDOMINAL, BILATERAL SALPINGO-OOPHORECTOMY, COMBINED  2006     WISDOM TEETH EXTRACTION         Prior to Admission Medications  Current Outpatient Medications   Medication Sig Dispense Refill    Aspirin 81 MG CAPS Take 81 mg by mouth every morning      atenolol (TENORMIN) 25 MG tablet Take 1 tablet by mouth every morning      calcium carbonate (TUMS) 500 MG chewable tablet Take 1 chew tab by mouth 2 times daily      DULoxetine (CYMBALTA) 30 MG capsule Take 30 mg by mouth every morning      lisinopril (ZESTRIL) 30 MG tablet Take 1 tablet by mouth every morning      simvastatin (ZOCOR) 20 MG tablet Take 20 mg by mouth every morning      Vitamin D, Cholecalciferol, 10 MCG (400 UNIT) CHEW Take by mouth every morning         Allergies  Allergies   Allergen Reactions    Bee Venom Anaphylaxis and Hives    Penicillins Unknown    Sulfa (Sulfonamide Antibiotics) [Sulfa Antibiotics] Unknown    Sulfasalazine Rash       Social History  Social History     Socioeconomic History    Marital status:      Spouse name: Not on file    Number of children: Not on file    Years of education: Not on file    Highest education level: Not on file   Occupational History    Not on file   Tobacco Use    Smoking status: Never     Passive exposure: Never    Smokeless tobacco: Never   Substance and Sexual Activity    Alcohol use: Yes     Comment: 1x month    Drug use: Never    Sexual activity: Not on file   Other Topics Concern    Not on file   Social History Narrative    Not on file     Social Determinants of Health     Financial Resource Strain: Not on file   Food Insecurity: Not on file   Transportation Needs: Not on file   Physical Activity: Not on file   Stress: Not on file   Social Connections: Not on file   Interpersonal Safety: Not on file   Housing Stability: Not on file       Family History  Family History   Problem Relation Age of Onset    Cerebral aneurysm Mother     Anesthesia Reaction Brother         details unknown    Deep Vein Thrombosis (DVT) Maternal Grandmother        Review of  "Systems  The complete review of systems is negative other than noted in the HPI or here.   Anesthesia Evaluation   Pt has had prior anesthetic.     No history of anesthetic complications       ROS/MED HX  ENT/Pulmonary:  - neg pulmonary ROS     Neurologic:  - neg neurologic ROS     Cardiovascular: Comment: Denies chest pain/pressure, PETERS, orthopnea, edema.     (+) Dyslipidemia hypertension- -   -  - -                                 No previous cardiac testing     METS/Exercise Tolerance: 4 - Raking leaves, gardening Comment: Gardening, [place]   Hematologic:  - neg hematologic  ROS     Musculoskeletal: Comment: Myxoma of thigh, right  (+)  arthritis,             GI/Hepatic:     (+) GERD, Other,               (-) liver disease   Renal/Genitourinary:  - neg Renal ROS     Endo:  - neg endo ROS     Psychiatric/Substance Use:     (+) psychiatric history anxiety       Infectious Disease:  - neg infectious disease ROS     Malignancy:   (+) Malignancy, History of Other.Other CA Uterine cancer status post Surgery and Radiation.    Other:  - neg other ROS          BP (!) 164/99 (BP Location: Right arm, Patient Position: Sitting, Cuff Size: Adult Regular)   Pulse 61   Temp 97.8  F (36.6  C) (Oral)   Resp 16   Ht 1.575 m (5' 2\")   Wt 69 kg (152 lb 1.6 oz)   LMP  (LMP Unknown)   SpO2 96%   Breastfeeding No   BMI 27.82 kg/m      Physical Exam   Constitutional: Pleasant, well-appearing, no apparent distress, and appears stated age.  Eyes: Pupils equal, round and reactive to light, extra ocular muscles intact, sclera clear, conjunctiva normal.  HENT: Normocephalic and atraumatic, oral pharynx with moist mucus membranes, good dentition. No goiter appreciated.   Respiratory: Clear to auscultation bilaterally, no crackles or wheezing.  Cardiovascular: Regular rate and rhythm, normal S1 and S2, and no murmur noted.  Carotids +2, no bruits. No edema. Palpable pulses to radial  DP and PT arteries.   GI: " Non-distended abdomen  Lymph/Hematologic: No cervical lymphadenopathy and no supraclavicular lymphadenopathy.  Genitourinary:  Deferred  Skin: Warm and dry.  No rashes on exposed skin   Musculoskeletal: Full ROM of neck. There is no redness, warmth, or swelling of the visible joints. Gross motor strength is normal.    Neurologic: Awake, alert, oriented to name, place and time. Cranial nerves II-XII are grossly intact. Gait is normal.   Neuropsychiatric: Calm, cooperative. Normal affect.       Prior Labs/Diagnostic Studies   All labs and imaging personally reviewed     EKG/ stress test - if available please see in ROS above   No results found.    The patient's records and results personally reviewed by this provider.     Outside records reviewed from: Care Everywhere    LAB/DIAGNOSTIC STUDIES TODAY:  CBC    Assessment  Shonna Joy is a 69 year old female seen as a PAC referral for risk assessment and optimization for anesthesia.    Plan/Recommendations  Pt will be optimized for the proposed procedure.  See below for details on the assessment, risk, and preoperative recommendations    NEUROLOGY  - No history of TIA, CVA or seizure    -Post Op delirium risk factors:  No risk identified    ENT  - No current airway concerns.  Will need to be reassessed day of surgery.  Mallampati: III  TM: > 3    CARDIAC  - No history of CAD and Afib  - Hypertension  Blood pressure somewhat elevated at today's exam.  Recommended checking over the next two weeks and if remains >160/90, instructed to follow up with PCP for further evaluation and treatment. Patient will keep a record of home measurements.   - Hyperlipidemia  Well controlled on home regimen    - METS (Metabolic Equivalents)  Patient performs 4 or more METS exercise without symptoms             Total Score: 0      RCRI-Very low risk: Class 1 0.4% complication rate             Total Score: 0        PULMONARY    YASMINE Low Risk             Total Score: 2    YASMINE: Hypertension     "YASMINE: Over 50 ys old      - Denies asthma or inhaler use  - Tobacco History    History   Smoking Status    Never   Smokeless Tobacco    Never       GI  - GERD  Controlled on medications: PRN Tums  PONV High Risk  Total Score: 3           1 AN PONV: Pt is Female    1 AN PONV: Patient is not a current smoker    1 AN PONV: Intended Post Op Opioids        /RENAL  - History of uterine cancer  S/p surgery in radiation. Now in remission.   - Baseline Creatinine  0.68    ENDOCRINE    - BMI: Estimated body mass index is 27.82 kg/m  as calculated from the following:    Height as of this encounter: 1.575 m (5' 2\").    Weight as of this encounter: 69 kg (152 lb 1.6 oz).  Overweight (BMI 25.0-29.9)  - No history of Diabetes Mellitus    HEME  VTE Low Risk 0.26%             Total Score: 1    VTE: Greater than 59 yrs old      - Platelet dysfunction secondary to Aspirin (Jaime, many others). Hold 7 days prior to surgery.       MSK  - Osteoarthritis  Recommend consideration for careful positioning to limit patient discomfort.    Patient IS Frail             Total Score: 4    Frailty: Slower walking speed    Frailty: Decrease in strength    Frailty: Increased exhaustion    Frailty: Overall lack of energy      Due to the patient's risk, a referral to Physical Medicine and Rehabilitation has been made.      PSYCH  - Anxiety  Continue Cymbalta without interruption.     Different anesthesia methods/types have been discussed with the patient, but they are aware that the final plan will be decided by the assigned anesthesia provider on the date of service.    The patient is optimized for their procedure. AVS with information on surgery time/arrival time, meds and NPO status given by nursing staff. No further diagnostic testing indicated.      On the day of service:     Prep time: 14 minutes  Visit time: 20 minutes  Documentation time: 6 minutes  ------------------------------------------  Total time: 40 minutes      Sneha Coelho" STUART  Preoperative Assessment Center  [facility]  [facility] and [facility]  Phone: [phone]  Fax: [phone]

## 2024-09-05 NOTE — ANESTHESIA PREPROCEDURE EVALUATION
Anesthesia Pre-Procedure Evaluation    Patient: Shonna Joy   MRN: [id number] : 1955        Procedure : Procedure(s):  removal right thigh tumor          Past Medical History:   Diagnosis Date    Anxiety     HLD (hyperlipidemia)     HTN (hypertension)     Myxoma of right thigh     Osteoarthritis       Past Surgical History:   Procedure Laterality Date    CATARACT EXTRACTION      FLEXIBLE SIGMOIDOSCOPY      HYSTERECTOMY TOTAL ABDOMINAL, BILATERAL SALPINGO-OOPHORECTOMY, COMBINED      WISDOM TEETH EXTRACTION        Allergies   Allergen Reactions    Bee Venom Anaphylaxis and Hives    Penicillins Unknown    Sulfa (Sulfonamide Antibiotics) [Sulfa Antibiotics] Unknown    Sulfasalazine Rash      Social History     Tobacco Use    Smoking status: Never     Passive exposure: Never    Smokeless tobacco: Never   Substance Use Topics    Alcohol use: Yes     Comment: 1x month      Wt Readings from Last 1 Encounters:   24 69 kg (152 lb 1.6 oz)        Anesthesia Evaluation   Pt has had prior anesthetic.     No history of anesthetic complications       ROS/MED HX  ENT/Pulmonary:  - neg pulmonary ROS     Neurologic:  - neg neurologic ROS     Cardiovascular: Comment: Denies chest pain/pressure, PETERS, orthopnea, edema.     (+) Dyslipidemia hypertension- -   -  - -                                 No previous cardiac testing     METS/Exercise Tolerance: 4 - Raking leaves, gardening Comment: Gardening, [place]   Hematologic:  - neg hematologic  ROS     Musculoskeletal: Comment: Myxoma of thigh, right  (+)  arthritis,             GI/Hepatic:     (+) GERD, Other,               (-) liver disease   Renal/Genitourinary:  - neg Renal ROS     Endo:  - neg endo ROS     Psychiatric/Substance Use:     (+) psychiatric history anxiety       Infectious Disease:  - neg infectious disease ROS     Malignancy:   (+) Malignancy, History of Other.Other CA Uterine cancer status post Surgery and Radiation.   "  Other:  - neg other ROS          Physical Exam    Airway        Mallampati: II       Respiratory Devices and Support         Dental       (+) Minor Abnormalities - some fillings, tiny chips      Cardiovascular          Rhythm and rate: regular     Pulmonary                   OUTSIDE LABS:  CBC:   Lab Results   Component Value Date    WBC 6.1 08/19/2024    HGB 14.6 08/19/2024    HCT 45.2 08/19/2024     08/19/2024     BMP:   Lab Results   Component Value Date     04/17/2024     07/21/2023    POTASSIUM 3.8 04/17/2024    POTASSIUM 4.3 07/21/2023    CHLORIDE 104 04/17/2024    CHLORIDE 103 07/21/2023    CO2 28 04/17/2024    CO2 26 07/21/2023    BUN 8.2 04/17/2024    BUN 6.7 (L) 07/21/2023    CR 0.68 04/17/2024    CR 0.73 07/21/2023     (H) 04/17/2024     (H) 07/21/2023     COAGS: No results found for: \"PTT\", \"INR\", \"FIBR\"  POC: No results found for: \"BGM\", \"HCG\", \"HCGS\"  HEPATIC:   Lab Results   Component Value Date    ALBUMIN 4.1 07/21/2023    PROTTOTAL 6.3 (L) 07/21/2023    ALT 24 07/21/2023    AST 19 07/21/2023    ALKPHOS 91 07/21/2023    BILITOTAL 0.4 07/21/2023     OTHER:   Lab Results   Component Value Date    YODIT 9.8 04/17/2024    TSH 1.74 11/18/2020    SED 16 11/18/2020       Anesthesia Plan    ASA Status:  2    NPO Status:  NPO Appropriate    Anesthesia Type: General.     - Airway: LMA   Induction: Intravenous.   Maintenance: TIVA.        Consents    Anesthesia Plan(s) and associated risks, benefits, and realistic alternatives discussed. Questions answered and patient/representative(s) expressed understanding.     - Discussed:     - Discussed with:  Patient            Postoperative Care            Comments:               Jordy Jung MD    I have reviewed the pertinent notes and labs in the chart from the past 30 days and (re)examined the patient.  Any updates or changes from those notes are reflected in this note.              # Overweight: Estimated body mass index is " "27.82 kg/m  as calculated from the following:    Height as of 8/19/24: 1.575 m (5' 2\").    Weight as of 8/19/24: 69 kg (152 lb 1.6 oz).      "

## 2024-09-06 ENCOUNTER — ANESTHESIA (OUTPATIENT)
Dept: SURGERY | Facility: AMBULATORY SURGERY CENTER | Age: 69
End: 2024-09-06
Payer: COMMERCIAL

## 2024-09-06 ENCOUNTER — HOSPITAL ENCOUNTER (OUTPATIENT)
Facility: AMBULATORY SURGERY CENTER | Age: 69
Discharge: HOME | End: 2024-09-06
Attending: ORTHOPAEDIC SURGERY
Payer: COMMERCIAL

## 2024-09-06 VITALS
TEMPERATURE: 98 F | DIASTOLIC BLOOD PRESSURE: 92 MMHG | HEART RATE: 73 BPM | BODY MASS INDEX: 28.52 KG/M2 | SYSTOLIC BLOOD PRESSURE: 145 MMHG | RESPIRATION RATE: 15 BRPM | WEIGHT: 155 LBS | HEIGHT: 62 IN | OXYGEN SATURATION: 98 %

## 2024-09-06 DIAGNOSIS — D21.21 MYXOMA OF THIGH, RIGHT: Primary | ICD-10-CM

## 2024-09-06 PROCEDURE — 27339 EXC THIGH/KNEE TUM DEP 5CM/>: CPT | Mod: RT

## 2024-09-06 PROCEDURE — 99100 ANES PT EXTEME AGE<1 YR&>70: CPT | Performed by: NURSE ANESTHETIST, CERTIFIED REGISTERED

## 2024-09-06 PROCEDURE — 88307 TISSUE EXAM BY PATHOLOGIST: CPT | Mod: 26 | Performed by: PATHOLOGY

## 2024-09-06 PROCEDURE — 27339 EXC THIGH/KNEE TUM DEP 5CM/>: CPT | Performed by: ANESTHESIOLOGY

## 2024-09-06 PROCEDURE — 88307 TISSUE EXAM BY PATHOLOGIST: CPT | Mod: TC | Performed by: ORTHOPAEDIC SURGERY

## 2024-09-06 PROCEDURE — 99100 ANES PT EXTEME AGE<1 YR&>70: CPT | Performed by: ANESTHESIOLOGY

## 2024-09-06 PROCEDURE — 27339 EXC THIGH/KNEE TUM DEP 5CM/>: CPT | Performed by: NURSE ANESTHETIST, CERTIFIED REGISTERED

## 2024-09-06 RX ORDER — HYDROMORPHONE HYDROCHLORIDE 1 MG/ML
0.4 INJECTION, SOLUTION INTRAMUSCULAR; INTRAVENOUS; SUBCUTANEOUS EVERY 5 MIN PRN
Status: DISCONTINUED | OUTPATIENT
Start: 2024-09-06 | End: 2024-09-07 | Stop reason: HOSPADM

## 2024-09-06 RX ORDER — LABETALOL HYDROCHLORIDE 5 MG/ML
10 INJECTION, SOLUTION INTRAVENOUS
Status: DISCONTINUED | OUTPATIENT
Start: 2024-09-06 | End: 2024-09-07 | Stop reason: HOSPADM

## 2024-09-06 RX ORDER — DEXAMETHASONE SODIUM PHOSPHATE 10 MG/ML
4 INJECTION, SOLUTION INTRAMUSCULAR; INTRAVENOUS
Status: DISCONTINUED | OUTPATIENT
Start: 2024-09-06 | End: 2024-09-07 | Stop reason: HOSPADM

## 2024-09-06 RX ORDER — CEFAZOLIN SODIUM 2 G/50ML
2 SOLUTION INTRAVENOUS
Status: COMPLETED | OUTPATIENT
Start: 2024-09-06 | End: 2024-09-06

## 2024-09-06 RX ORDER — ONDANSETRON 4 MG/1
4 TABLET, ORALLY DISINTEGRATING ORAL EVERY 30 MIN PRN
Status: DISCONTINUED | OUTPATIENT
Start: 2024-09-06 | End: 2024-09-07 | Stop reason: HOSPADM

## 2024-09-06 RX ORDER — ONDANSETRON 2 MG/ML
4 INJECTION INTRAMUSCULAR; INTRAVENOUS EVERY 30 MIN PRN
Status: DISCONTINUED | OUTPATIENT
Start: 2024-09-06 | End: 2024-09-07 | Stop reason: HOSPADM

## 2024-09-06 RX ORDER — OXYCODONE HYDROCHLORIDE 5 MG/1
10 TABLET ORAL
Status: DISCONTINUED | OUTPATIENT
Start: 2024-09-06 | End: 2024-09-07 | Stop reason: HOSPADM

## 2024-09-06 RX ORDER — NALOXONE HYDROCHLORIDE 0.4 MG/ML
0.1 INJECTION, SOLUTION INTRAMUSCULAR; INTRAVENOUS; SUBCUTANEOUS
Status: DISCONTINUED | OUTPATIENT
Start: 2024-09-06 | End: 2024-09-07 | Stop reason: HOSPADM

## 2024-09-06 RX ORDER — HYDROMORPHONE HYDROCHLORIDE 1 MG/ML
0.2 INJECTION, SOLUTION INTRAMUSCULAR; INTRAVENOUS; SUBCUTANEOUS EVERY 5 MIN PRN
Status: DISCONTINUED | OUTPATIENT
Start: 2024-09-06 | End: 2024-09-07 | Stop reason: HOSPADM

## 2024-09-06 RX ORDER — AMOXICILLIN 250 MG
1-2 CAPSULE ORAL 2 TIMES DAILY
Qty: 30 TABLET | Refills: 0 | Status: SHIPPED | OUTPATIENT
Start: 2024-09-06 | End: 2024-09-20

## 2024-09-06 RX ORDER — DEXAMETHASONE SODIUM PHOSPHATE 4 MG/ML
INJECTION, SOLUTION INTRA-ARTICULAR; INTRALESIONAL; INTRAMUSCULAR; INTRAVENOUS; SOFT TISSUE PRN
Status: DISCONTINUED | OUTPATIENT
Start: 2024-09-06 | End: 2024-09-06

## 2024-09-06 RX ORDER — ONDANSETRON 2 MG/ML
INJECTION INTRAMUSCULAR; INTRAVENOUS PRN
Status: DISCONTINUED | OUTPATIENT
Start: 2024-09-06 | End: 2024-09-06

## 2024-09-06 RX ORDER — GLYCOPYRROLATE 0.2 MG/ML
INJECTION, SOLUTION INTRAMUSCULAR; INTRAVENOUS PRN
Status: DISCONTINUED | OUTPATIENT
Start: 2024-09-06 | End: 2024-09-06

## 2024-09-06 RX ORDER — FENTANYL CITRATE 50 UG/ML
25 INJECTION, SOLUTION INTRAMUSCULAR; INTRAVENOUS EVERY 5 MIN PRN
Status: DISCONTINUED | OUTPATIENT
Start: 2024-09-06 | End: 2024-09-07 | Stop reason: HOSPADM

## 2024-09-06 RX ORDER — SODIUM CHLORIDE, SODIUM LACTATE, POTASSIUM CHLORIDE, CALCIUM CHLORIDE 600; 310; 30; 20 MG/100ML; MG/100ML; MG/100ML; MG/100ML
INJECTION, SOLUTION INTRAVENOUS CONTINUOUS
Status: DISCONTINUED | OUTPATIENT
Start: 2024-09-06 | End: 2024-09-07 | Stop reason: HOSPADM

## 2024-09-06 RX ORDER — ACETAMINOPHEN 325 MG/1
650 TABLET ORAL
Status: DISCONTINUED | OUTPATIENT
Start: 2024-09-06 | End: 2024-09-07 | Stop reason: HOSPADM

## 2024-09-06 RX ORDER — OXYCODONE HYDROCHLORIDE 5 MG/1
5 TABLET ORAL
Status: COMPLETED | OUTPATIENT
Start: 2024-09-06 | End: 2024-09-06

## 2024-09-06 RX ORDER — KETOROLAC TROMETHAMINE 30 MG/ML
INJECTION, SOLUTION INTRAMUSCULAR; INTRAVENOUS PRN
Status: DISCONTINUED | OUTPATIENT
Start: 2024-09-06 | End: 2024-09-06

## 2024-09-06 RX ORDER — PROPOFOL 10 MG/ML
INJECTION, EMULSION INTRAVENOUS PRN
Status: DISCONTINUED | OUTPATIENT
Start: 2024-09-06 | End: 2024-09-06

## 2024-09-06 RX ORDER — OXYCODONE HYDROCHLORIDE 5 MG/1
5 TABLET ORAL EVERY 6 HOURS PRN
Qty: 10 TABLET | Refills: 0 | Status: SHIPPED | OUTPATIENT
Start: 2024-09-06 | End: 2024-09-20

## 2024-09-06 RX ORDER — ACETAMINOPHEN 325 MG/1
975 TABLET ORAL ONCE
Status: COMPLETED | OUTPATIENT
Start: 2024-09-06 | End: 2024-09-06

## 2024-09-06 RX ORDER — FENTANYL CITRATE 50 UG/ML
50 INJECTION, SOLUTION INTRAMUSCULAR; INTRAVENOUS EVERY 5 MIN PRN
Status: DISCONTINUED | OUTPATIENT
Start: 2024-09-06 | End: 2024-09-07 | Stop reason: HOSPADM

## 2024-09-06 RX ORDER — LIDOCAINE 40 MG/G
CREAM TOPICAL
Status: DISCONTINUED | OUTPATIENT
Start: 2024-09-06 | End: 2024-09-07 | Stop reason: HOSPADM

## 2024-09-06 RX ORDER — LIDOCAINE HYDROCHLORIDE 20 MG/ML
INJECTION, SOLUTION INFILTRATION; PERINEURAL PRN
Status: DISCONTINUED | OUTPATIENT
Start: 2024-09-06 | End: 2024-09-06

## 2024-09-06 RX ORDER — OXYCODONE HYDROCHLORIDE 5 MG/1
5 TABLET ORAL
Status: DISCONTINUED | OUTPATIENT
Start: 2024-09-06 | End: 2024-09-07 | Stop reason: HOSPADM

## 2024-09-06 RX ORDER — CEFAZOLIN SODIUM 2 G/50ML
2 SOLUTION INTRAVENOUS SEE ADMIN INSTRUCTIONS
Status: DISCONTINUED | OUTPATIENT
Start: 2024-09-06 | End: 2024-09-07 | Stop reason: HOSPADM

## 2024-09-06 RX ORDER — ACETAMINOPHEN 325 MG/1
650 TABLET ORAL EVERY 4 HOURS PRN
Qty: 50 TABLET | Refills: 0 | Status: SHIPPED | OUTPATIENT
Start: 2024-09-06 | End: 2024-09-20

## 2024-09-06 RX ORDER — PROPOFOL 10 MG/ML
INJECTION, EMULSION INTRAVENOUS CONTINUOUS PRN
Status: DISCONTINUED | OUTPATIENT
Start: 2024-09-06 | End: 2024-09-06

## 2024-09-06 RX ORDER — BUPIVACAINE HYDROCHLORIDE AND EPINEPHRINE 2.5; 5 MG/ML; UG/ML
INJECTION, SOLUTION INFILTRATION; PERINEURAL PRN
Status: DISCONTINUED | OUTPATIENT
Start: 2024-09-06 | End: 2024-09-06 | Stop reason: HOSPADM

## 2024-09-06 RX ORDER — FENTANYL CITRATE 50 UG/ML
INJECTION, SOLUTION INTRAMUSCULAR; INTRAVENOUS PRN
Status: DISCONTINUED | OUTPATIENT
Start: 2024-09-06 | End: 2024-09-06

## 2024-09-06 RX ADMIN — FENTANYL CITRATE 25 MCG: 50 INJECTION, SOLUTION INTRAMUSCULAR; INTRAVENOUS at 07:38

## 2024-09-06 RX ADMIN — LIDOCAINE HYDROCHLORIDE 60 MG: 20 INJECTION, SOLUTION INFILTRATION; PERINEURAL at 07:18

## 2024-09-06 RX ADMIN — DEXAMETHASONE SODIUM PHOSPHATE 4 MG: 4 INJECTION, SOLUTION INTRA-ARTICULAR; INTRALESIONAL; INTRAMUSCULAR; INTRAVENOUS; SOFT TISSUE at 07:28

## 2024-09-06 RX ADMIN — Medication 100 MCG: at 07:46

## 2024-09-06 RX ADMIN — GLYCOPYRROLATE 0.2 MG: 0.2 INJECTION, SOLUTION INTRAMUSCULAR; INTRAVENOUS at 07:25

## 2024-09-06 RX ADMIN — FENTANYL CITRATE 25 MCG: 50 INJECTION, SOLUTION INTRAMUSCULAR; INTRAVENOUS at 07:11

## 2024-09-06 RX ADMIN — ONDANSETRON 4 MG: 2 INJECTION INTRAMUSCULAR; INTRAVENOUS at 07:54

## 2024-09-06 RX ADMIN — Medication 100 MCG: at 07:27

## 2024-09-06 RX ADMIN — SODIUM CHLORIDE, SODIUM LACTATE, POTASSIUM CHLORIDE, CALCIUM CHLORIDE: 600; 310; 30; 20 INJECTION, SOLUTION INTRAVENOUS at 07:11

## 2024-09-06 RX ADMIN — OXYCODONE HYDROCHLORIDE 5 MG: 5 TABLET ORAL at 09:09

## 2024-09-06 RX ADMIN — PROPOFOL 150 MCG/KG/MIN: 10 INJECTION, EMULSION INTRAVENOUS at 07:18

## 2024-09-06 RX ADMIN — Medication 100 MCG: at 07:54

## 2024-09-06 RX ADMIN — CEFAZOLIN SODIUM 2 G: 2 SOLUTION INTRAVENOUS at 07:11

## 2024-09-06 RX ADMIN — Medication 100 MCG: at 07:35

## 2024-09-06 RX ADMIN — PROPOFOL 200 MG: 10 INJECTION, EMULSION INTRAVENOUS at 07:18

## 2024-09-06 RX ADMIN — KETOROLAC TROMETHAMINE 15 MG: 30 INJECTION, SOLUTION INTRAMUSCULAR; INTRAVENOUS at 07:56

## 2024-09-06 RX ADMIN — ACETAMINOPHEN 975 MG: 325 TABLET ORAL at 06:28

## 2024-09-06 NOTE — ANESTHESIA PROCEDURE NOTES
Airway       Patient location during procedure: OR       Procedure Start/Stop Times: 9/6/2024 7:20 AM  Staff -        CRNA: Ebony Partida APRN CRNA       Performed By: CRNA  Consent for Airway        Urgency: elective  Indications and Patient Condition       Indications for airway management: rhiannon-procedural       Induction type:intravenous       Mask difficulty assessment: 1 - vent by mask    Final Airway Details       Final airway type: supraglottic airway    Supraglottic Airway Details        Type: LMA       Brand: LMA Unique       LMA size: 4    Post intubation assessment        Placement verified by: capnometry and chest rise        Number of attempts at approach: 1       Number of other approaches attempted: 0       Secured with: tape       Ease of procedure: easy       Dentition: Intact and Unchanged    Medication(s) Administered   Medication Administration Time: 9/6/2024 7:20 AM

## 2024-09-06 NOTE — OP NOTE
Preop diagnosis: Tumor right thigh    Postoperative diagnosis: Benign tumor right thigh    Procedure performed: Removal of tumor, right thigh, 7 cm, deep    Surgeon: Mitchel JAQUEZ.  There was no resident available to scrub.  Ms. Jung's assistance was required throughout the case for safe visualization and maintenance of hemostasis.    Estimated blood loss: 3 cc    Pathology submitted: Tumor right thigh in formalin.    Patient was interviewed in the preoperative area with her family present.  Risk and benefits have been reviewed.  Consent was signed.  The surgical site was marked my initials and line of intended incision.    Preoperative brief was performed.  The patient was taken the operating room received a general anesthetic and in a supine position with the leg maximally abducted and flexed the right lower extremity was prepped and draped sterilely.  The foot was placed on a sterile Harp stand.  Surgical timeout was performed.    A 4 inch incision was made directly over the palpable mass just on the lateral aspect of the right labia extending parallel to the fibers of the gracilis muscle.  The gracilis muscle fibers were split.  The tumor was visible and the LigaSure device was used to circumferentially cauterize adjacent soft tissues attached to the tumor.  The tumor was lifted from the wound.  The wound was irrigated and closed in a standard fashion.    Postoperative debrief was performed.    Postoperative plan: 1.  Biopsy results will be in the MyChart.  2.  The patient is scheduled to see Ms. Jung on September 20.

## 2024-09-06 NOTE — BRIEF OP NOTE
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 Stark/STUART for wound check

## 2024-09-06 NOTE — ANESTHESIA CARE TRANSFER NOTE
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    Anesthesia Type:   General     Note:    Oropharynx: oropharynx clear of all foreign objects and spontaneously breathing  Level of Consciousness: awake  Oxygen Supplementation: face mask    Independent Airway: airway patency satisfactory and stable  Dentition: dentition unchanged  Vital Signs Stable: post-procedure vital signs reviewed and stable  Report to RN Given: handoff report given  Patient transferred to: PACU    Handoff Report: Identifed the Patient, Identified the Reponsible Provider, Reviewed the pertinent medical history, Discussed the surgical course, Reviewed Intra-OP anesthesia mangement and issues during anesthesia, Set expectations for post-procedure period and Allowed opportunity for questions and acknowledgement of understanding      Vitals:  Vitals Value Taken Time   /81    Temp 98.3    Pulse 80 09/06/24 0819   Resp 19 09/06/24 0819   SpO2 100 % 09/06/24 0819   Vitals shown include unfiled device data.    Electronically Signed By: THUY Puente CRNA  September 6, 2024  8:19 AM

## 2024-09-06 NOTE — DISCHARGE INSTRUCTIONS
"[facility]  Home Care Following Anesthesia  For 24 hours after surgery:  Get plenty of rest.  A responsible adult must stay with you for at least 24 hours after you leave the surgery center.  Do not drive or use heavy equipment.  If you have weakness or tingling, don't drive or use heavy equipment until this feeling goes away.   Do not drink alcohol.   Avoid strenuous or risky activities.  Ask for help when climbing stairs.  You may feel lightheaded.  IF so, sit for a few minutes before standing.  Have someone help you get up.   If you have nausea (feel sick to your stomach): Drink only clear liquids such as apple juice, ginger ale, broth or 7-Up.  Rest may also help.  Be sure to drink enough fluids.  Move to a regular diet as you feel able.   You may have a slight fever.  Call the doctor if your fever is over 100 F (37.7 C) (taken under the tongue) or lasts longer than 24 hours.  You may have a dry mouth, a sore throat, muscle aches or trouble sleeping. These should go away after 24 hours.  Do not make important or legal decisions.   It is recommended to avoid smoking.        Today you received a Marcaine or bupivacaine block to numb the nerves near your surgery site.  This is a block using local anesthetic or \"numbing\" medication injected around the nerves to anesthetize or \"numb\" the area supplied by those nerves.  This block is injected into the muscle layer near your surgical site.  The medication may numb the location where you had surgery for 6-18 hours, but may last up to 24 hours.  If your surgical site is an arm or leg you should be careful with your affected limb, since it is possible to injure your limb without being aware of it due to the numbing.  Until full feeling returns, you should guard against bumping or hitting your limb, and avoid extreme hot or cold temperatures on the skin.  As the block wears off, the feeling will return as a tingling or prickly " sensation near your surgical site.  You will experience more discomfort from your incision as the feeling returns.  You may want to take a pain pill (a narcotic or Tylenol if this was prescribed by your surgeon) when you start to experience mild pain before the pain beccomes more severe.  If your pain medications do not control your pain you should notifiy your surgeon.    Tips for taking pain medications  To get the best pain relief possible, remember these points:  Take pain medications as directed, before pain becomes severe.  Pain medication can upset your stomach: taking it with food may help.  Constipation is a common side effect of pain medication. Drink plenty of  fluids.  Eat foods high in fiber. Take a stool softener if recommended by your doctor or pharmacist.  Do not drink alcohol, drive or operate machinery while taking pain medications.  Ask about other ways to control pain, such as with heat, ice or relaxation.    Tylenol/Acetaminophen Consumption    If you feel your pain relief is insufficient, you may take Tylenol/Acetaminophen in addition to your narcotic pain medication.   Be careful not to exceed 4,000 mg of Tylenol/Acetaminophen in a 24 hour period from all sources.  If you are taking extra strength Tylenol/acetaminophen (500 mg), the maximum dose is 8 tablets in 24 hours.  If you are taking regular strength acetaminophen (325 mg), the maximum dose is 12 tablets in 24 hours.  Tylenol 975 mg given at 6:30 am.   Ok to take more after 12:30 pm.      Toradol 15 mg given at  8:00 am.  Do not take any NSAIDs for 4 hours.  OK after 12:00 pm.  (Ibuprofen, Advil, Motrin, Naproxen, Aleve).      Call a doctor for any of the following:  Signs of infection (fever, growing tenderness at the surgery site, a large amount of drainage or bleeding, severe pain, foul-smelling drainage, redness, swelling).  It has been over 8 to 10 hours since surgery and you are still not able to urinate (pass water).  Headache for  over 24 hours.  Numbness, tingling or weakness the day after surgery (if you had spinal anesthesia).  Signs of Covid-19 infection (temperature over 100 degrees, shortness of breath, cough, loss of taste/smell, generalized body aches, persistent headache, chills, sore throat, nausea/vomiting/diarrhea)  Your doctor is:  Dr. Mitchel Stark, Orthopaedics: [phone]                    Or dial [phone] and ask for the resident on call for:  Orthopaedics  For emergency care, call the:  [facility]:  [phone] (TTY for hearing impaired: [phone])

## 2024-09-06 NOTE — ANESTHESIA POSTPROCEDURE EVALUATION
Patient: Shonna Joy    Procedure: Procedure(s):  removal right thigh tumor       Anesthesia Type:  General    Note:  Disposition: Outpatient   Postop Pain Control: Uneventful            Sign Out: Well controlled pain   PONV: No   Neuro/Psych: Uneventful            Sign Out: Acceptable/Baseline neuro status   Airway/Respiratory: Uneventful            Sign Out: Acceptable/Baseline resp. status   CV/Hemodynamics: Uneventful            Sign Out: Acceptable CV status; No obvious hypovolemia; No obvious fluid overload   Other NRE: NONE   DID A NON-ROUTINE EVENT OCCUR?            Last vitals:  Vitals Value Taken Time   /49 09/06/24 0830   Temp 36.9  C (98.4  F) 09/06/24 0830   Pulse 73 09/06/24 0830   Resp 13 09/06/24 0830   SpO2 99 % 09/06/24 0830       Electronically Signed By: Jordy Jung MD  September 6, 2024  2:06 PM

## 2024-09-10 LAB
PATH REPORT.COMMENTS IMP SPEC: NORMAL
PATH REPORT.COMMENTS IMP SPEC: NORMAL
PATH REPORT.FINAL DX SPEC: NORMAL
PATH REPORT.GROSS SPEC: NORMAL
PATH REPORT.MICROSCOPIC SPEC OTHER STN: NORMAL
PATH REPORT.RELEVANT HX SPEC: NORMAL
PHOTO IMAGE: NORMAL

## 2024-09-12 ENCOUNTER — VIRTUAL VISIT (OUTPATIENT)
Dept: PHYSICAL MEDICINE AND REHAB | Facility: CLINIC | Age: 69
End: 2024-09-12
Attending: PHYSICIAN ASSISTANT
Payer: COMMERCIAL

## 2024-09-12 DIAGNOSIS — D21.21 MYXOMA OF THIGH, RIGHT: ICD-10-CM

## 2024-09-12 DIAGNOSIS — R53.81 PHYSICAL DECONDITIONING: Primary | ICD-10-CM

## 2024-09-12 DIAGNOSIS — R54 FRAILTY: ICD-10-CM

## 2024-09-12 PROCEDURE — 99203 OFFICE O/P NEW LOW 30 MIN: CPT | Mod: 95 | Performed by: PHYSICIAN ASSISTANT

## 2024-09-12 ASSESSMENT — PAIN SCALES - GENERAL: PAINLEVEL: MILD PAIN (2)

## 2024-09-12 NOTE — PROGRESS NOTES
Video-Visit Details    Video visit Start time: 9:30 Am    Type of service:  Video Visit    Video End Time:9:48 AM    Originating Location (pt. Location): Home    Distant Location (provider location):  Off- Site    Platform used for Video Visit: [employer]         PM&R Clinic Note     Patient Name: Shonna Joy : 1955 Medical Record: [id number]     Requesting Physician/clinician: Sneha Coelho PA-C           History of Present Illness:     Shonna Joy is a 69 year old female with past medical history of hypertension, hyperlipidemia, osteoarthritis, history of uterine cancer, and anxiety who presents to the Physical Medicine and Rehabillitation clinic for Pre-Rehabilitation assessment.  Patient underwent removal of right thigh tumor with Dr. Stark on 24. Briefly, patient initially saw orthopedic surgery 2024 for evaluation of right inner thigh mass.  She noticed this mass about a month prior in May in her right inner posterior groin.  She did have pain with sitting but otherwise it did not bother.  Patient does also have fatigue that have been ongoing for several months.  She also reported weight loss that was unintentional over 6 months of about 8 pounds.  Patient additionally reported fever and night sweats and decreased appetite and increased thirst.  Patients biopsy returned as a Myxoma. Patient does feel weaker then usual.  Patient feels slower with walking.  Patient is not taking the oxycodone.  She is treating with Tylenol.  Patient did have some falls prior to surgery.          Past Medical and Surgical History:     Past Medical History:   Diagnosis Date    Anxiety     HLD (hyperlipidemia)     HTN (hypertension)     Myxoma of right thigh     Osteoarthritis      Past Surgical History:   Procedure Laterality Date    CATARACT EXTRACTION      FLEXIBLE SIGMOIDOSCOPY  2006    HYSTERECTOMY TOTAL ABDOMINAL, BILATERAL SALPINGO-OOPHORECTOMY, COMBINED  2006    RESECT TUMOR LOWER EXTREMITY Right  9/6/2024    Procedure: removal right thigh tumor;  Surgeon: Mitchel Stark MD;  Location: [facility] OR    WISDOM TEETH EXTRACTION              Social History:     Social History     Tobacco Use    Smoking status: Never     Passive exposure: Never    Smokeless tobacco: Never   Substance Use Topics    Alcohol use: Yes     Comment: 1x month       Marital Status:   Living situation: House  Family support:   Vocational History: Retired  Tobacco use: Never  Alcohol use: 1x a month  Recreational drug use: none         Functional history:     Shonna Joy is independent with all aspects of  life.    ADLs: independent ( sleeping in guest room to avoid stairs)  Assistive devices: walker and cane  iADLs (medication management and finances): independent  Hand dominance: Right  Driving:  currently           Family History:     Family History   Problem Relation Age of Onset    Cerebral aneurysm Mother     Anesthesia Reaction Brother         details unknown    Deep Vein Thrombosis (DVT) Maternal Grandmother             Medications:     Current Outpatient Medications   Medication Sig Dispense Refill    acetaminophen (TYLENOL) 325 MG tablet Take 2 tablets (650 mg) by mouth every 4 hours as needed for mild pain. 50 tablet 0    Aspirin 81 MG CAPS Take 81 mg by mouth every morning      atenolol (TENORMIN) 25 MG tablet Take 1 tablet by mouth every morning      calcium carbonate (TUMS) 500 MG chewable tablet Take 1 chew tab by mouth 2 times daily      DULoxetine (CYMBALTA) 30 MG capsule Take 30 mg by mouth every morning      lisinopril (ZESTRIL) 30 MG tablet Take 1 tablet by mouth every morning      oxyCODONE (ROXICODONE) 5 MG tablet Take 1 tablet (5 mg) by mouth every 6 hours as needed for moderate to severe pain. 10 tablet 0    senna-docusate (SENOKOT-S/PERICOLACE) 8.6-50 MG tablet Take 1-2 tablets by mouth 2 times daily. 30 tablet 0    simvastatin (ZOCOR) 20 MG tablet Take 20 mg by mouth every morning       "Vitamin D, Cholecalciferol, 10 MCG (400 UNIT) CHEW Take by mouth every morning              Allergies:     Allergies   Allergen Reactions    Bee Venom Anaphylaxis and Hives    Penicillins Unknown    Sulfa (Sulfonamide Antibiotics) [Sulfa Antibiotics] Unknown    Sulfasalazine Rash              ROS:     A focused ROS is negative other than the symptoms noted above in the HPI.      Constitutional: denies any fevers, chills, any recent weight loss   Eyes: denies changes in visual acuity   Ears, Nose, Throat: denies any difficulty swallowing   Cardiovascular: denies any exertional chest pain or palpitation   Respiratory: denies dyspnea   Gastrointestinal: denies any nausea, vomiting, abdominal pain, diarrhea or constipation   Genitourinary: denies any dysuria, hematuria, frequency or urgency   Musculoskeletal: denies any joint pain, neck pain or back pain muscle pain  Neurologic: denies any headache, changes in motor or sensory function, no loss of balance or vertigo   Psychiatric: denies mood changes; sleeps OK              Physical Examiniation:     VITAL SIGNS: Not taken due to virtual visit  BMI: Estimated body mass index is 28.35 kg/m  as calculated from the following:    Height as of 9/6/24: 1.575 m (5' 2\").    Weight as of 9/6/24: 70.3 kg (155 lb).    Physical Exam   EYES: Eyes grossly normal to inspection.  No discharge or erythema, or obvious scleral/conjunctival abnormalities.  SKIN: Visible skin clear. No significant rash, abnormal pigmentation or lesions.  NEURO: Cranial nerves grossly intact.  Mentation and speech appropriate for age.  GENERAL: Healthy, alert and no distress  RESP: No audible wheeze, cough, or visible cyanosis.  No visible retractions or increased work of breathing.    PSYCH: Mentation appears normal, affect normal/bright, judgement and insight intact, normal speech and appearance well-groomed.             Laboratory/Imaging:   Labs  Lab on 08/19/2024   Component Date Value Ref Range Status "    WBC Count 08/19/2024 6.1  4.0 - 11.0 10e3/uL Final    RBC Count 08/19/2024 4.94  3.80 - 5.20 10e6/uL Final    Hemoglobin 08/19/2024 14.6  11.7 - 15.7 g/dL Final    Hematocrit 08/19/2024 45.2  35.0 - 47.0 % Final    MCV 08/19/2024 92  78 - 100 fL Final    MCH 08/19/2024 29.6  26.5 - 33.0 pg Final    MCHC 08/19/2024 32.3  31.5 - 36.5 g/dL Final    RDW 08/19/2024 12.3  10.0 - 15.0 % Final    Platelet Count 08/19/2024 294  150 - 450 10e3/uL Final     Surgical Pathology 9/10/24       Component  Resulting Agency   Case Report   Surgical Pathology Report                         Case: [id number]                                   Authorizing Provider:  Mitchel Stark MD   Collected:           09/06/2024 07:51 AM           Ordering Location:     [facility] OR  Received:            09/06/2024 08:37 AM                                  [place]                                                                   Pathologist:           Yaritza Tilley MD                                                           Specimen:    Thigh, Right, RIGHT THIGH MASS                                                            Final Diagnosis   Soft tissue, right thigh, excision:  - Intramuscular myxoma  - Negative for malignancy   Electronically signed by Yaritza Tilley MD on 9/10/2024 at  4:50 PM        Imaging:   MRI Thigh wo/w Contrast 6/11/24           Assessment/Plan:   Assessment:   1. Myxoma of thigh, right  2. Physical deconditioning  - Physical Therapy  Referral; Future  3. Frailty  - Adult Physical Medicine & Rehab  Referral  - Physical Therapy  Referral; Future    Plan:  Patient education: In depth discussion and education was provided about the assessment and implications of each of the below recommendations for management. Patient indicated readiness to learn, all questions were answered and understanding of material presented was confirmed.    Physical deconditioning/ Frailty:  Patient with increased weakness as well as slow mobility.  Discussed patient would likely benefit from physical therapy for strengthening conditioning.  Due to patient having surgery recently we will tentatively schedule for first week in October and discussed she will need clearance from surgical team prior to starting.  Encouraged to continue walking to help decrease any further deconditioning.    Therapy/equipment/braces: Use walker and cane as needed. Referral place to PT.     Medications: As directed    Interventions: none    Referral / follow up with other providers: Follow-up with orthopedic surgery as scheduled    Follow up: As needed    SKYLER JoyaC  Physical Medicine & Rehabilitation    On day of encounter time spent in chart review and with patient in consultation, exam, education, coordination of care, review of outside charts/data and documentation:  33 minutes     I have attempted to proof read for major spelling errors and apologize for any minor errors I may have missed.      This note was dictated using voice recognition software. Any grammatical or context distortions are unintentional and inherent to the software.

## 2024-09-12 NOTE — NURSING NOTE
Current patient location: [address] [place] [id number]    Is the patient currently in the state of [place]? YES    Visit mode:VIDEO    If the visit is dropped, the patient can be reconnected by: VIDEO VISIT: Text to cell phone:   Telephone Information:   Mobile [phone]       Will anyone else be joining the visit? NO  (If patient encounters technical issues they should call [phone]    How would you like to obtain your AVS? MyChart    Are changes needed to the allergy or medication list? No    Are refills needed on medications prescribed by this physician? NO    Rooming Documentation:  Questionnaire(s) completed      Reason for visit: Consult    Wendy ROCHA

## 2024-09-12 NOTE — LETTER
2024       RE: Shonna Joy  [address]  [facility] [address]     Dear Colleague,    Thank you for referring your patient, Shonna Joy, to the [facility] PHYSICAL MEDICINE AND REHABILITATION CLINIC [place] at [facility]. Please see a copy of my visit note below.    Video-Visit Details    Video visit Start time: 9:30 Am    Type of service:  Video Visit    Video End Time:9:48 AM    Originating Location (pt. Location): Home    Distant Location (provider location):  Off- Site    Platform used for Video Visit: [facility]         PM&R Clinic Note     Patient Name: Shonna Joy : 1955 Medical Record: [id number]     Requesting Physician/clinician: Sneha Coelho PA-C           History of Present Illness:     Shonna Joy is a 69 year old female with past medical history of hypertension, hyperlipidemia, osteoarthritis, history of uterine cancer, and anxiety who presents to the Physical Medicine and Rehabillitation clinic for Pre-Rehabilitation assessment.  Patient underwent removal of right thigh tumor with Dr. Stark on 24. Briefly, patient initially saw orthopedic surgery 2024 for evaluation of right inner thigh mass.  She noticed this mass about a month prior in May in her right inner posterior groin.  She did have pain with sitting but otherwise it did not bother.  Patient does also have fatigue that have been ongoing for several months.  She also reported weight loss that was unintentional over 6 months of about 8 pounds.  Patient additionally reported fever and night sweats and decreased appetite and increased thirst.  Patients biopsy returned as a Myxoma. Patient does feel weaker then usual.  Patient feels slower with walking.  Patient is not taking the oxycodone.  She is treating with Tylenol.  Patient did have some falls prior to surgery.          Past Medical and Surgical History:     Past Medical History:   Diagnosis Date     Anxiety       HLD (hyperlipidemia)      HTN (hypertension)      Myxoma of right thigh      Osteoarthritis      Past Surgical History:   Procedure Laterality Date     CATARACT EXTRACTION       FLEXIBLE SIGMOIDOSCOPY  2006     HYSTERECTOMY TOTAL ABDOMINAL, BILATERAL SALPINGO-OOPHORECTOMY, COMBINED  2006     RESECT TUMOR LOWER EXTREMITY Right 9/6/2024    Procedure: removal right thigh tumor;  Surgeon: Mitchel Stark MD;  Location: [facility] OR     WISDOM TEETH EXTRACTION              Social History:     Social History     Tobacco Use     Smoking status: Never     Passive exposure: Never     Smokeless tobacco: Never   Substance Use Topics     Alcohol use: Yes     Comment: 1x month       Marital Status:   Living situation: House  Family support:   Vocational History: Retired  Tobacco use: Never  Alcohol use: 1x a month  Recreational drug use: none         Functional history:     Shonna Joy is independent with all aspects of  life.    ADLs: independent ( sleeping in guest room to avoid stairs)  Assistive devices: walker and cane  iADLs (medication management and finances): independent  Hand dominance: Right  Driving:  currently           Family History:     Family History   Problem Relation Age of Onset     Cerebral aneurysm Mother      Anesthesia Reaction Brother         details unknown     Deep Vein Thrombosis (DVT) Maternal Grandmother             Medications:     Current Outpatient Medications   Medication Sig Dispense Refill     acetaminophen (TYLENOL) 325 MG tablet Take 2 tablets (650 mg) by mouth every 4 hours as needed for mild pain. 50 tablet 0     Aspirin 81 MG CAPS Take 81 mg by mouth every morning       atenolol (TENORMIN) 25 MG tablet Take 1 tablet by mouth every morning       calcium carbonate (TUMS) 500 MG chewable tablet Take 1 chew tab by mouth 2 times daily       DULoxetine (CYMBALTA) 30 MG capsule Take 30 mg by mouth every morning       lisinopril (ZESTRIL) 30 MG tablet Take 1 tablet by  "mouth every morning       oxyCODONE (ROXICODONE) 5 MG tablet Take 1 tablet (5 mg) by mouth every 6 hours as needed for moderate to severe pain. 10 tablet 0     senna-docusate (SENOKOT-S/PERICOLACE) 8.6-50 MG tablet Take 1-2 tablets by mouth 2 times daily. 30 tablet 0     simvastatin (ZOCOR) 20 MG tablet Take 20 mg by mouth every morning       Vitamin D, Cholecalciferol, 10 MCG (400 UNIT) CHEW Take by mouth every morning              Allergies:     Allergies   Allergen Reactions     Bee Venom Anaphylaxis and Hives     Penicillins Unknown     Sulfa (Sulfonamide Antibiotics) [Sulfa Antibiotics] Unknown     Sulfasalazine Rash              ROS:     A focused ROS is negative other than the symptoms noted above in the HPI.      Constitutional: denies any fevers, chills, any recent weight loss   Eyes: denies changes in visual acuity   Ears, Nose, Throat: denies any difficulty swallowing   Cardiovascular: denies any exertional chest pain or palpitation   Respiratory: denies dyspnea   Gastrointestinal: denies any nausea, vomiting, abdominal pain, diarrhea or constipation   Genitourinary: denies any dysuria, hematuria, frequency or urgency   Musculoskeletal: denies any joint pain, neck pain or back pain muscle pain  Neurologic: denies any headache, changes in motor or sensory function, no loss of balance or vertigo   Psychiatric: denies mood changes; sleeps OK              Physical Examiniation:     VITAL SIGNS: Not taken due to virtual visit  BMI: Estimated body mass index is 28.35 kg/m  as calculated from the following:    Height as of 9/6/24: 1.575 m (5' 2\").    Weight as of 9/6/24: 70.3 kg (155 lb).    Physical Exam   EYES: Eyes grossly normal to inspection.  No discharge or erythema, or obvious scleral/conjunctival abnormalities.  SKIN: Visible skin clear. No significant rash, abnormal pigmentation or lesions.  NEURO: Cranial nerves grossly intact.  Mentation and speech appropriate for age.  GENERAL: Healthy, alert and no " distress  RESP: No audible wheeze, cough, or visible cyanosis.  No visible retractions or increased work of breathing.    PSYCH: Mentation appears normal, affect normal/bright, judgement and insight intact, normal speech and appearance well-groomed.             Laboratory/Imaging:   Labs  Lab on 08/19/2024   Component Date Value Ref Range Status     WBC Count 08/19/2024 6.1  4.0 - 11.0 10e3/uL Final     RBC Count 08/19/2024 4.94  3.80 - 5.20 10e6/uL Final     Hemoglobin 08/19/2024 14.6  11.7 - 15.7 g/dL Final     Hematocrit 08/19/2024 45.2  35.0 - 47.0 % Final     MCV 08/19/2024 92  78 - 100 fL Final     MCH 08/19/2024 29.6  26.5 - 33.0 pg Final     MCHC 08/19/2024 32.3  31.5 - 36.5 g/dL Final     RDW 08/19/2024 12.3  10.0 - 15.0 % Final     Platelet Count 08/19/2024 294  150 - 450 10e3/uL Final     Surgical Pathology 9/10/24       Component  Resulting Agency   Case Report   Surgical Pathology Report                         Case: [id number]                                   Authorizing Provider:  Mitchel Stark MD   Collected:           09/06/2024 07:51 AM           Ordering Location:     [facility] OR  Received:            09/06/2024 08:37 AM                                  [place]                                                                   Pathologist:           Yaritza Tilley MD                                                           Specimen:    Thigh, Right, RIGHT THIGH MASS                                                            Final Diagnosis   Soft tissue, right thigh, excision:  - Intramuscular myxoma  - Negative for malignancy   Electronically signed by Yaritza Tilley MD on 9/10/2024 at  4:50 PM        Imaging:   MRI Thigh wo/w Contrast 6/11/24           Assessment/Plan:   Assessment:   1. Myxoma of thigh, right  2. Physical deconditioning  - Physical Therapy  Referral; Future  3. Frailty  - Adult Physical Medicine & Rehab  Referral  - Physical  Therapy  Referral; Future    Plan:  Patient education: In depth discussion and education was provided about the assessment and implications of each of the below recommendations for management. Patient indicated readiness to learn, all questions were answered and understanding of material presented was confirmed.    Physical deconditioning/ Frailty: Patient with increased weakness as well as slow mobility.  Discussed patient would likely benefit from physical therapy for strengthening conditioning.  Due to patient having surgery recently we will tentatively schedule for first week in October and discussed she will need clearance from surgical team prior to starting.  Encouraged to continue walking to help decrease any further deconditioning.    Therapy/equipment/braces: Use walker and cane as needed. Referral place to PT.     Medications: As directed    Interventions: none    Referral / follow up with other providers: Follow-up with orthopedic surgery as scheduled    Follow up: As needed    Sharmila Chawla PA-C  Physical Medicine & Rehabilitation    On day of encounter time spent in chart review and with patient in consultation, exam, education, coordination of care, review of outside charts/data and documentation:  33 minutes     I have attempted to proof read for major spelling errors and apologize for any minor errors I may have missed.      This note was dictated using voice recognition software. Any grammatical or context distortions are unintentional and inherent to the software.            Again, thank you for allowing me to participate in the care of your patient.      Sincerely,    Sharmila Chawla PA-C

## 2024-09-12 NOTE — Clinical Note
Hello, I saw this patient in the Pre-Rehabilitation clinic unfortunately after her surgery. She is complaining of slow walking and muscle weakness. I think she would benefit from PT and placed an order for beginning of October.  I explained to patient that she normally would need to wait 4 to 6 weeks after surgery to ensure healing is complete.   Sharmila Chawla PA-C

## 2024-09-20 ENCOUNTER — OFFICE VISIT (OUTPATIENT)
Dept: ORTHOPEDICS | Facility: CLINIC | Age: 69
End: 2024-09-20
Payer: COMMERCIAL

## 2024-09-20 DIAGNOSIS — D21.9 MYXOMA: Primary | ICD-10-CM

## 2024-09-20 PROCEDURE — 99024 POSTOP FOLLOW-UP VISIT: CPT | Performed by: PHYSICIAN ASSISTANT

## 2024-09-20 NOTE — NURSING NOTE
Reason For Visit:   Chief Complaint   Patient presents with    Surgical Followup     DOS 9/6/24 S/P removal right thigh mass       LMP  (LMP Unknown)     Pain Assessment  Patient Currently in Pain: [place]    Nahed Rodas LPN

## 2024-09-20 NOTE — LETTER
9/20/2024      Shonna Joy  [address]  [facility] [address]      Dear Colleague,    Thank you for referring your patient, Shonna Joy, to the [facility] [place]. Please see a copy of my visit note below.    Chief Complaint:   Postoperative diagnosis: Benign tumor right thigh  9/6/24 Procedure performed: Removal of tumor, right thigh, 7 cm, deep  Surgeon: Mitchel Stark    HPI: Shonna is a 69 year old female who is here for follow-up 2 wks s/p right thigh mass excision.  Patient reports overall, her soreness and swelling is improving.  She was taking Tylenol, but is no longer doing that.  She feels her gait is improving.  She did sign up for physical therapy, which will start next week.  No other concerns.    Physical Exam: 69-year-old female who is alert and oriented no apparent distress.  She has a mildly antalgic gait without gait assistance.  Right inner thigh incision is healing well with no erythema or drainage.  She has mild swelling and no ecchymosis.  Minimal tenderness.  Good range of motion of the right hip.  No calf tenderness.    Pathology:   Final Diagnosis   Soft tissue, right thigh, excision:  - Intramuscular myxoma  - Negative for malignancy     Impression: 69 year old female doing well s/p excision of right thigh myxoma    Plan: Patient can shower and get the incision wet.  No soaking in a tub or pool for 1 more week.  It is normal to have swelling, this will gradually improve over time.  Gradually increase activities as tolerated.  Back off if she has pain or excessive swelling.  We do not expect this tumor to recur, but certainly if she has a mass in that area down the road, we would be glad to see her back.  Otherwise follow-up as needed.  Call with any concerns.  She understands and agrees with plan.  All questions answered.      Again, thank you for allowing me to participate in the care of your patient.        Sincerely,        Vidhya Jung PA-C

## 2024-09-20 NOTE — PROGRESS NOTES
Chief Complaint:   Postoperative diagnosis: Benign tumor right thigh  9/6/24 Procedure performed: Removal of tumor, right thigh, 7 cm, deep  Surgeon: Mitchel Stark    HPI: Shonna is a 69 year old female who is here for follow-up 2 wks s/p right thigh mass excision.  Patient reports overall, her soreness and swelling is improving.  She was taking Tylenol, but is no longer doing that.  She feels her gait is improving.  She did sign up for physical therapy, which will start next week.  No other concerns.    Physical Exam: 69-year-old female who is alert and oriented no apparent distress.  She has a mildly antalgic gait without gait assistance.  Right inner thigh incision is healing well with no erythema or drainage.  She has mild swelling and no ecchymosis.  Minimal tenderness.  Good range of motion of the right hip.  No calf tenderness.    Pathology:   Final Diagnosis   Soft tissue, right thigh, excision:  - Intramuscular myxoma  - Negative for malignancy     Impression: 69 year old female doing well s/p excision of right thigh myxoma    Plan: Patient can shower and get the incision wet.  No soaking in a tub or pool for 1 more week.  It is normal to have swelling, this will gradually improve over time.  Gradually increase activities as tolerated.  Back off if she has pain or excessive swelling.  We do not expect this tumor to recur, but certainly if she has a mass in that area down the road, we would be glad to see her back.  Otherwise follow-up as needed.  Call with any concerns.  She understands and agrees with plan.  All questions answered.

## 2024-10-11 ENCOUNTER — THERAPY VISIT (OUTPATIENT)
Dept: PHYSICAL THERAPY | Facility: REHABILITATION | Age: 69
End: 2024-10-11
Attending: PHYSICIAN ASSISTANT
Payer: COMMERCIAL

## 2024-10-11 DIAGNOSIS — R54 FRAILTY: ICD-10-CM

## 2024-10-11 DIAGNOSIS — R53.81 PHYSICAL DECONDITIONING: ICD-10-CM

## 2024-10-11 PROCEDURE — 97161 PT EVAL LOW COMPLEX 20 MIN: CPT | Mod: GP

## 2024-10-11 PROCEDURE — 97110 THERAPEUTIC EXERCISES: CPT | Mod: GP

## 2024-10-11 NOTE — PROGRESS NOTES
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 (Shonna shares that the surgery was on the 6th). Per referring provider: \"Gradually increase activities as tolerated.  Back off if she has pain or excessive swelling\"  She shares that she first noted LBP with sciatica and difficulty walking, leading to a few falls when walking around [place]. Since mass has been removed, incision is mostly healed, able to amb indep. Shares that she is walking slower and that her LBP is not as painful, is not currently taking any medication for this.         Presenting condition or subjective complaint:    Date of onset: 09/12/24    Relevant medical history:    has a past medical history of Anxiety, HLD (hyperlipidemia), HTN (hypertension), Myxoma of right thigh, and Osteoarthritis.   Dates & types of surgery:      Prior diagnostic imaging/testing results:       Prior therapy history for the same diagnosis, illness or injury:        Living Environment- not answered due to arriving late   Social support:     Type of home:     Stairs to enter the home:         Ramp:     Stairs inside the home:         Help at home:    Equipment owned:       Employment:      Hobbies/Interests:      Patient goals for therapy:      Pain assessment:      Objective   Cognitive Status Examination  Level of Consciousness: Alert  Follows Commands and Answers Questions: 100% of the time, Follows multi step instructions  Personal Safety and Judgement: Intact  Memory: Intact    STRENGTH: 5/5     TRANSFERS: Independent    GAIT: Demonstrates decreased " endurance     BALANCE: Imbalance and dizziness reported with turns     SPECIAL TESTS  6 Minute Walk Test (6MWT)   1,066 feet     L lateral lean towards end of test, slowing down pace for last 450 feet, reporting dizziness with turns and R posterior thigh fatigue at end    5 Times Sit-to-Stand (5TSTS) 8.5s using momentum, can feel where insicion was        Assessment & Plan   CLINICAL IMPRESSIONS  Medical Diagnosis: Physical deconditioning (R53.81), Frailty    Treatment Diagnosis: Force production deficit, sensory detection deficit   Impression/Assessment: Patient reports imbalance and decreased endurance after recent surgery.  The following significant findings have been identified: Impaired balance, Impaired gait, and Decreased activity tolerance. These impairments interfere with their ability to perform recreational activities and community mobility as compared to previous level of function. Testing as reported above indicate decreased safety and balance with functional mobility. This patient will benefit from skilled physical therapy services to address these concerns.      Clinical Decision Making (Complexity):  Clinical Presentation: Stable/Uncomplicated  Clinical Presentation Rationale: based on medical and personal factors listed in PT evaluation  Clinical Decision Making (Complexity): Low complexity    PLAN OF CARE  Treatment Interventions:  Interventions: Gait Training, Manual Therapy, Neuromuscular Re-education, Therapeutic Activity, Therapeutic Exercise, Self-Care/Home Management, Standardized Testing    Long Term Goals     PT Goal 1  Goal Identifier: HEP  Goal Description: Pt will be able to demonstrate HEP activities without need for cues from provider to demonstrate understanding and independence with HEP.  Rationale: to maximize safety and independence with performance of ADLs and functional tasks  Target Date: 10/11/24  PT Goal 2  Goal Identifier: Functional mobility  Goal Description: Pt will  demonstrate a 4 point improvement on the FGA to demonstrate minimum clinically significant difference in score to demonstrate improved safety with functional mobility and decrease risk of falls.  Rationale: to maximize safety and independence with performance of ADLs and functional tasks  Target Date: 10/11/24  PT Goal 3  Goal Identifier: 6MWT  Goal Description: Pt will be able to ambulate 1545 ft on 6MWT to demonstrate age appropriate endurance.  Rationale: to maximize safety and independence with performance of ADLs and functional tasks  Target Date: 10/11/24      Frequency of Treatment: 1x/week  Duration of Treatment: 4 weeks    Recommended Referrals to Other Professionals:  None at this time  Education Assessment:   Learner/Method: Patient;Demonstration;Pictures/Video;Listening    Risks and benefits of evaluation/treatment have been explained.   Patient/Family/caregiver agrees with Plan of Care.     Evaluation Time:     PT Eval, Low Complexity Minutes (01706): 36       Signing Clinician: Nicole Herman PT        [facility]                                                                                   OUTPATIENT PHYSICAL THERAPY      PLAN OF TREATMENT FOR OUTPATIENT REHABILITATION   Patient's Last Name, First Name, Shonna Green    YOB: 1955   Provider's Name   [facility]   Medical Record No.  [id number]     Onset Date: 09/12/24  Start of Care Date: 10/11/24     Medical Diagnosis:  Physical deconditioning (R53.81), Frailty      PT Treatment Diagnosis:  Force production deficit, sensory detection deficit Plan of Treatment  Frequency/Duration: 1x/week/ 4 weeks    Certification date from 10/11/24 to 11/01/24         See note for plan of treatment details and functional goals     Nicole Herman, PT                         I CERTIFY THE NEED FOR THESE SERVICES FURNISHED UNDER        THIS PLAN OF TREATMENT AND WHILE UNDER  MY CARE     (Physician attestation of this document indicates review and certification of the therapy plan).              Referring Provider:  Sharmila Chawla 10/11/24 to 11/01/24     Initial Assessment  See Epic Evaluation- Start of Care Date: 10/11/24

## 2024-10-18 ENCOUNTER — THERAPY VISIT (OUTPATIENT)
Dept: PHYSICAL THERAPY | Facility: REHABILITATION | Age: 69
End: 2024-10-18
Attending: PHYSICIAN ASSISTANT
Payer: COMMERCIAL

## 2024-10-18 ENCOUNTER — TELEPHONE (OUTPATIENT)
Dept: PHYSICAL THERAPY | Facility: REHABILITATION | Age: 69
End: 2024-10-18

## 2024-10-18 DIAGNOSIS — R54 FRAILTY: ICD-10-CM

## 2024-10-18 DIAGNOSIS — R53.81 PHYSICAL DECONDITIONING: Primary | ICD-10-CM

## 2024-10-18 PROCEDURE — 97112 NEUROMUSCULAR REEDUCATION: CPT | Mod: GP | Performed by: PHYSICAL THERAPIST

## 2024-10-18 PROCEDURE — 97110 THERAPEUTIC EXERCISES: CPT | Mod: GP | Performed by: PHYSICAL THERAPIST

## 2024-10-25 ENCOUNTER — THERAPY VISIT (OUTPATIENT)
Dept: PHYSICAL THERAPY | Facility: REHABILITATION | Age: 69
End: 2024-10-25
Attending: PHYSICIAN ASSISTANT
Payer: COMMERCIAL

## 2024-10-25 DIAGNOSIS — R53.81 PHYSICAL DECONDITIONING: Primary | ICD-10-CM

## 2024-10-25 DIAGNOSIS — R54 FRAILTY: ICD-10-CM

## 2024-10-25 PROCEDURE — 97112 NEUROMUSCULAR REEDUCATION: CPT | Mod: GP | Performed by: PHYSICAL THERAPIST

## 2024-10-25 PROCEDURE — 97110 THERAPEUTIC EXERCISES: CPT | Mod: GP | Performed by: PHYSICAL THERAPIST

## 2024-12-14 ENCOUNTER — HEALTH MAINTENANCE LETTER (OUTPATIENT)
Age: 69
End: 2024-12-14

## 2025-02-05 ENCOUNTER — LAB REQUISITION (OUTPATIENT)
Dept: LAB | Facility: CLINIC | Age: 70
End: 2025-02-05

## 2025-02-05 DIAGNOSIS — E78.2 MIXED HYPERLIPIDEMIA: ICD-10-CM

## 2025-02-05 DIAGNOSIS — I10 ESSENTIAL (PRIMARY) HYPERTENSION: ICD-10-CM

## 2025-02-05 PROCEDURE — 80053 COMPREHEN METABOLIC PANEL: CPT | Performed by: PHYSICIAN ASSISTANT

## 2025-02-05 PROCEDURE — 80061 LIPID PANEL: CPT | Performed by: PHYSICIAN ASSISTANT

## 2025-02-06 LAB
ALBUMIN SERPL BCG-MCNC: 3.7 G/DL (ref 3.5–5.2)
ALP SERPL-CCNC: 92 U/L (ref 40–150)
ALT SERPL W P-5'-P-CCNC: 29 U/L (ref 0–50)
ANION GAP SERPL CALCULATED.3IONS-SCNC: 14 MMOL/L (ref 7–15)
AST SERPL W P-5'-P-CCNC: 28 U/L (ref 0–45)
BILIRUB SERPL-MCNC: 0.3 MG/DL
BUN SERPL-MCNC: 11.5 MG/DL (ref 8–23)
CALCIUM SERPL-MCNC: 9.7 MG/DL (ref 8.8–10.4)
CHLORIDE SERPL-SCNC: 104 MMOL/L (ref 98–107)
CHOLEST SERPL-MCNC: 185 MG/DL
CREAT SERPL-MCNC: 0.62 MG/DL (ref 0.51–0.95)
EGFRCR SERPLBLD CKD-EPI 2021: >90 ML/MIN/1.73M2
FASTING STATUS PATIENT QL REPORTED: ABNORMAL
FASTING STATUS PATIENT QL REPORTED: ABNORMAL
GLUCOSE SERPL-MCNC: 161 MG/DL (ref 70–99)
HCO3 SERPL-SCNC: 19 MMOL/L (ref 22–29)
HDLC SERPL-MCNC: 66 MG/DL
LDLC SERPL CALC-MCNC: 83 MG/DL
NONHDLC SERPL-MCNC: 119 MG/DL
POTASSIUM SERPL-SCNC: 3.6 MMOL/L (ref 3.4–5.3)
PROT SERPL-MCNC: 6.4 G/DL (ref 6.4–8.3)
SODIUM SERPL-SCNC: 137 MMOL/L (ref 135–145)
TRIGL SERPL-MCNC: 178 MG/DL

## 2025-08-04 ENCOUNTER — LAB REQUISITION (OUTPATIENT)
Dept: LAB | Facility: CLINIC | Age: 70
End: 2025-08-04

## 2025-08-04 DIAGNOSIS — I10 ESSENTIAL (PRIMARY) HYPERTENSION: ICD-10-CM

## 2025-08-04 PROCEDURE — 82040 ASSAY OF SERUM ALBUMIN: CPT | Performed by: PHYSICIAN ASSISTANT

## 2025-08-05 LAB
ALBUMIN SERPL BCG-MCNC: 3.8 G/DL (ref 3.5–5.2)
ALP SERPL-CCNC: 103 U/L (ref 40–150)
ALT SERPL W P-5'-P-CCNC: 63 U/L (ref 0–50)
ANION GAP SERPL CALCULATED.3IONS-SCNC: 9 MMOL/L (ref 7–15)
AST SERPL W P-5'-P-CCNC: 37 U/L (ref 0–45)
BILIRUB SERPL-MCNC: 0.3 MG/DL
BUN SERPL-MCNC: 9 MG/DL (ref 8–23)
CALCIUM SERPL-MCNC: 9.7 MG/DL (ref 8.8–10.4)
CHLORIDE SERPL-SCNC: 101 MMOL/L (ref 98–107)
CREAT SERPL-MCNC: 0.59 MG/DL (ref 0.51–0.95)
EGFRCR SERPLBLD CKD-EPI 2021: >90 ML/MIN/1.73M2
GLUCOSE SERPL-MCNC: 167 MG/DL (ref 70–99)
HCO3 SERPL-SCNC: 24 MMOL/L (ref 22–29)
POTASSIUM SERPL-SCNC: 3.6 MMOL/L (ref 3.4–5.3)
PROT SERPL-MCNC: 6.3 G/DL (ref 6.4–8.3)
SODIUM SERPL-SCNC: 134 MMOL/L (ref 135–145)

## 2025-08-10 ENCOUNTER — HEALTH MAINTENANCE LETTER (OUTPATIENT)
Age: 70
End: 2025-08-10

## (undated) DEVICE — ESU GROUND PAD ADULT W/CORD E7507

## (undated) DEVICE — GLOVE BIOGEL PI ULTRATOUCH SZ 7.0 41170

## (undated) DEVICE — GLOVE BIOGEL PI MICRO SZ 7.5 48575

## (undated) DEVICE — SU VICRYL 2-0 CT-2 27" UND J269H

## (undated) DEVICE — SU MONOCRYL 4-0 PS-2 27" UND Y426H

## (undated) DEVICE — GLOVE BIOGEL PI MICRO INDICATOR UNDERGLOVE SZ 8.0 48980

## (undated) DEVICE — GLOVE BIOGEL PI MICRO INDICATOR UNDERGLOVE SZ 7.5 48975

## (undated) DEVICE — SU VICRYL 0 CT-1 27" J340H

## (undated) DEVICE — DRSG AQUACEL AG HYDROFIBER 3.5X6" 422604

## (undated) DEVICE — LINEN ORTHO PACK 5446

## (undated) DEVICE — SOL NACL 0.9% IRRIG 500ML BOTTLE 2F7123

## (undated) DEVICE — PREP CHLORAPREP 26ML TINTED HI-LITE ORANGE 930815

## (undated) DEVICE — SUCTION MANIFOLD NEPTUNE 2 SYS 1 PORT 702-025-000

## (undated) DEVICE — DRAPE STERI U 1015

## (undated) DEVICE — DRAPE STOCKINETTE IMPERVIOUS 10" 21048

## (undated) DEVICE — ESU PENCIL SMOKE EVAC W/ROCKER SWITCH 0703-047-000

## (undated) RX ORDER — KETOROLAC TROMETHAMINE 30 MG/ML
INJECTION, SOLUTION INTRAMUSCULAR; INTRAVENOUS
Status: DISPENSED
Start: 2024-09-06

## (undated) RX ORDER — DEXAMETHASONE SODIUM PHOSPHATE 4 MG/ML
INJECTION, SOLUTION INTRA-ARTICULAR; INTRALESIONAL; INTRAMUSCULAR; INTRAVENOUS; SOFT TISSUE
Status: DISPENSED
Start: 2024-09-06

## (undated) RX ORDER — EPINEPHRINE 1 MG/ML
INJECTION, SOLUTION INTRAMUSCULAR; SUBCUTANEOUS
Status: DISPENSED
Start: 2024-09-06

## (undated) RX ORDER — PROPOFOL 10 MG/ML
INJECTION, EMULSION INTRAVENOUS
Status: DISPENSED
Start: 2024-09-06

## (undated) RX ORDER — BUPIVACAINE HYDROCHLORIDE 2.5 MG/ML
INJECTION, SOLUTION EPIDURAL; INFILTRATION; INTRACAUDAL
Status: DISPENSED
Start: 2024-09-06

## (undated) RX ORDER — LIDOCAINE HYDROCHLORIDE 10 MG/ML
INJECTION, SOLUTION INFILTRATION; PERINEURAL
Status: DISPENSED
Start: 2024-06-27

## (undated) RX ORDER — ONDANSETRON 2 MG/ML
INJECTION INTRAMUSCULAR; INTRAVENOUS
Status: DISPENSED
Start: 2024-09-06

## (undated) RX ORDER — CEFAZOLIN SODIUM 2 G/50ML
SOLUTION INTRAVENOUS
Status: DISPENSED
Start: 2024-09-06

## (undated) RX ORDER — FENTANYL CITRATE-0.9 % NACL/PF 10 MCG/ML
PLASTIC BAG, INJECTION (ML) INTRAVENOUS
Status: DISPENSED
Start: 2024-09-06

## (undated) RX ORDER — GLYCOPYRROLATE 0.2 MG/ML
INJECTION INTRAMUSCULAR; INTRAVENOUS
Status: DISPENSED
Start: 2024-09-06

## (undated) RX ORDER — ACETAMINOPHEN 325 MG/1
TABLET ORAL
Status: DISPENSED
Start: 2024-09-06

## (undated) RX ORDER — OXYCODONE HYDROCHLORIDE 5 MG/1
TABLET ORAL
Status: DISPENSED
Start: 2024-09-06

## (undated) RX ORDER — FENTANYL CITRATE 50 UG/ML
INJECTION, SOLUTION INTRAMUSCULAR; INTRAVENOUS
Status: DISPENSED
Start: 2024-09-06